# Patient Record
Sex: MALE | Race: WHITE | NOT HISPANIC OR LATINO | Employment: OTHER | URBAN - NONMETROPOLITAN AREA
[De-identification: names, ages, dates, MRNs, and addresses within clinical notes are randomized per-mention and may not be internally consistent; named-entity substitution may affect disease eponyms.]

---

## 2018-05-21 ENCOUNTER — OFFICE VISIT CONVERTED (OUTPATIENT)
Dept: FAMILY MEDICINE CLINIC | Age: 68
End: 2018-05-21
Attending: NURSE PRACTITIONER

## 2018-05-31 ENCOUNTER — OFFICE (OUTPATIENT)
Dept: URBAN - METROPOLITAN AREA CLINIC 5 | Facility: CLINIC | Age: 68
End: 2018-05-31

## 2018-05-31 VITALS — WEIGHT: 232 LBS | DIASTOLIC BLOOD PRESSURE: 76 MMHG | SYSTOLIC BLOOD PRESSURE: 106 MMHG | HEART RATE: 80 BPM

## 2018-05-31 DIAGNOSIS — R10.31 RIGHT LOWER QUADRANT PAIN: ICD-10-CM

## 2018-05-31 DIAGNOSIS — K21.9 GASTRO-ESOPHAGEAL REFLUX DISEASE WITHOUT ESOPHAGITIS: ICD-10-CM

## 2018-05-31 PROCEDURE — 99204 OFFICE O/P NEW MOD 45 MIN: CPT | Performed by: INTERNAL MEDICINE

## 2019-01-26 ENCOUNTER — HOSPITAL ENCOUNTER (OUTPATIENT)
Dept: OTHER | Facility: HOSPITAL | Age: 69
Discharge: HOME | End: 2019-01-26
Attending: NURSE PRACTITIONER

## 2019-01-26 ENCOUNTER — OFFICE VISIT CONVERTED (OUTPATIENT)
Dept: FAMILY MEDICINE CLINIC | Age: 69
End: 2019-01-26
Attending: NURSE PRACTITIONER

## 2019-01-26 LAB
ALBUMIN SERPL-MCNC: 3.9 G/DL (ref 3.5–5)
ALBUMIN/GLOB SERPL: 1.3 {RATIO} (ref 1.4–2.6)
ALP SERPL-CCNC: 71 U/L (ref 56–155)
ALT SERPL-CCNC: 81 U/L (ref 10–40)
ANION GAP SERPL CALC-SCNC: 16 MMOL/L (ref 8–19)
APPEARANCE UR: CLEAR
AST SERPL-CCNC: 83 U/L (ref 15–50)
BACTERIA UR QL AUTO: ABNORMAL
BILIRUB SERPL-MCNC: 0.95 MG/DL (ref 0.2–1.3)
BILIRUB UR QL: ABNORMAL
BUN SERPL-MCNC: 18 MG/DL (ref 5–25)
BUN/CREAT SERPL: 18 {RATIO} (ref 6–20)
CALCIUM SERPL-MCNC: 8.5 MG/DL (ref 8.7–10.4)
CASTS URNS QL MICRO: ABNORMAL /[LPF]
CHLORIDE SERPL-SCNC: 104 MMOL/L (ref 99–111)
COLOR UR: YELLOW
CONV CO2: 26 MMOL/L (ref 22–32)
CONV LEUKOCYTE ESTERASE: NEGATIVE
CONV TOTAL PROTEIN: 6.9 G/DL (ref 6.3–8.2)
CONV UROBILINOGEN IN URINE BY AUTOMATED TEST STRIP: 1 {EHRLICHU}/DL (ref 0.1–1)
CREAT UR-MCNC: 1.01 MG/DL (ref 0.7–1.2)
EPI CELLS #/AREA URNS HPF: ABNORMAL /[HPF]
ERYTHROCYTE [DISTWIDTH] IN BLOOD BY AUTOMATED COUNT: 12.9 % (ref 11.5–14.5)
GFR SERPLBLD BASED ON 1.73 SQ M-ARVRAT: >60 ML/MIN/{1.73_M2}
GLOBULIN UR ELPH-MCNC: 3 G/DL (ref 2–3.5)
GLUCOSE 24H UR-MCNC: NEGATIVE MG/DL
GLUCOSE SERPL-MCNC: 103 MG/DL (ref 70–99)
HBA1C MFR BLD: 13.8 G/DL (ref 14–18)
HCT VFR BLD AUTO: 41.9 % (ref 42–52)
HGB UR QL STRIP: ABNORMAL
KETONES UR QL STRIP: NEGATIVE MG/DL
MCH RBC QN AUTO: 28.7 PG (ref 27–31)
MCHC RBC AUTO-ENTMCNC: 32.9 G/DL (ref 33–37)
MCV RBC AUTO: 87.1 FL (ref 80–96)
MUCOUS THREADS URNS QL MICRO: ABNORMAL
NITRITE UR-MCNC: NEGATIVE MG/ML
OSMOLALITY SERPL CALC.SUM OF ELEC: 296 MOSM/KG (ref 273–304)
PH UR STRIP.AUTO: 5.5 [PH] (ref 5–8)
PLATELET # BLD AUTO: 262 10*3/UL (ref 130–400)
PMV BLD AUTO: 10.2 FL (ref 7.4–10.4)
POTASSIUM SERPL-SCNC: 3.6 MMOL/L (ref 3.5–5.3)
PROT UR-MCNC: NEGATIVE MG/DL
RBC # BLD AUTO: 4.81 10*6/UL (ref 4.7–6.1)
RBC # BLD AUTO: ABNORMAL /[HPF]
SODIUM SERPL-SCNC: 142 MMOL/L (ref 135–147)
SP GR UR STRIP: 1.02 (ref 1–1.03)
SPECIMEN SOURCE: ABNORMAL
UNIDENT CRYS URNS QL MICRO: ABNORMAL /[HPF]
WBC # BLD AUTO: 6.35 10*3/UL (ref 4.8–10.8)
WBC #/AREA URNS HPF: ABNORMAL /[HPF]

## 2019-01-28 LAB
C DIFF TOX B STL QL CT TISS CULT: NEGATIVE
CONV 027 TOXIN: NEGATIVE

## 2019-01-30 LAB
BACTERIA SPEC AEROBE CULT: NORMAL
O+P SPEC MICRO: NORMAL
O+P SPEC MICRO: NORMAL

## 2019-02-09 ENCOUNTER — HOSPITAL ENCOUNTER (OUTPATIENT)
Dept: OTHER | Facility: HOSPITAL | Age: 69
Discharge: HOME | End: 2019-02-09
Attending: NURSE PRACTITIONER

## 2019-02-09 LAB — PSA SERPL-MCNC: 0.6 NG/ML (ref 0–4)

## 2021-02-02 ENCOUNTER — HOSPITAL ENCOUNTER (OUTPATIENT)
Dept: VACCINE CLINIC | Facility: HOSPITAL | Age: 71
Discharge: HOME | End: 2021-02-02
Attending: INTERNAL MEDICINE

## 2021-02-05 ENCOUNTER — OFFICE VISIT CONVERTED (OUTPATIENT)
Dept: FAMILY MEDICINE CLINIC | Age: 71
End: 2021-02-05
Attending: NURSE PRACTITIONER

## 2021-02-08 ENCOUNTER — APPOINTMENT (OUTPATIENT)
Dept: CARDIOLOGY | Facility: HOSPITAL | Age: 71
End: 2021-02-08

## 2021-02-08 ENCOUNTER — APPOINTMENT (OUTPATIENT)
Dept: GENERAL RADIOLOGY | Facility: HOSPITAL | Age: 71
End: 2021-02-08

## 2021-02-08 ENCOUNTER — HOSPITAL ENCOUNTER (INPATIENT)
Facility: HOSPITAL | Age: 71
LOS: 2 days | Discharge: HOME | End: 2021-02-10
Attending: EMERGENCY MEDICINE | Admitting: INTERNAL MEDICINE

## 2021-02-08 ENCOUNTER — APPOINTMENT (OUTPATIENT)
Dept: CT IMAGING | Facility: HOSPITAL | Age: 71
End: 2021-02-08

## 2021-02-08 DIAGNOSIS — J12.82 PNEUMONIA DUE TO COVID-19 VIRUS: Primary | ICD-10-CM

## 2021-02-08 DIAGNOSIS — U07.1 PNEUMONIA DUE TO COVID-19 VIRUS: Primary | ICD-10-CM

## 2021-02-08 PROBLEM — N40.0 BPH WITHOUT OBSTRUCTION/LOWER URINARY TRACT SYMPTOMS: Status: ACTIVE | Noted: 2021-02-08

## 2021-02-08 PROBLEM — I10 HTN (HYPERTENSION): Status: ACTIVE | Noted: 2021-02-08

## 2021-02-08 PROBLEM — K21.9 GERD WITHOUT ESOPHAGITIS: Status: ACTIVE | Noted: 2021-02-08

## 2021-02-08 LAB
ALBUMIN SERPL-MCNC: 4 G/DL (ref 3.5–5.2)
ALBUMIN/GLOB SERPL: 1.3 G/DL
ALP SERPL-CCNC: 70 U/L (ref 39–117)
ALT SERPL W P-5'-P-CCNC: 27 U/L (ref 1–41)
ANION GAP SERPL CALCULATED.3IONS-SCNC: 15.2 MMOL/L (ref 5–15)
AST SERPL-CCNC: 23 U/L (ref 1–40)
B PARAPERT DNA SPEC QL NAA+PROBE: NOT DETECTED
B PERT DNA SPEC QL NAA+PROBE: NOT DETECTED
BASOPHILS # BLD AUTO: 0.01 10*3/MM3 (ref 0–0.2)
BASOPHILS NFR BLD AUTO: 0.2 % (ref 0–1.5)
BH CV LOWER VASCULAR LEFT COMMON FEMORAL AUGMENT: NORMAL
BH CV LOWER VASCULAR LEFT COMMON FEMORAL COMPETENT: NORMAL
BH CV LOWER VASCULAR LEFT COMMON FEMORAL COMPRESS: NORMAL
BH CV LOWER VASCULAR LEFT COMMON FEMORAL PHASIC: NORMAL
BH CV LOWER VASCULAR LEFT COMMON FEMORAL SPONT: NORMAL
BH CV LOWER VASCULAR LEFT DISTAL FEMORAL COMPRESS: NORMAL
BH CV LOWER VASCULAR LEFT GASTRONEMIUS COMPRESS: NORMAL
BH CV LOWER VASCULAR LEFT GREATER SAPH AK COMPRESS: NORMAL
BH CV LOWER VASCULAR LEFT GREATER SAPH BK COMPRESS: NORMAL
BH CV LOWER VASCULAR LEFT LESSER SAPH COMPRESS: NORMAL
BH CV LOWER VASCULAR LEFT MID FEMORAL AUGMENT: NORMAL
BH CV LOWER VASCULAR LEFT MID FEMORAL COMPETENT: NORMAL
BH CV LOWER VASCULAR LEFT MID FEMORAL COMPRESS: NORMAL
BH CV LOWER VASCULAR LEFT MID FEMORAL PHASIC: NORMAL
BH CV LOWER VASCULAR LEFT MID FEMORAL SPONT: NORMAL
BH CV LOWER VASCULAR LEFT PERONEAL COMPRESS: NORMAL
BH CV LOWER VASCULAR LEFT POPLITEAL AUGMENT: NORMAL
BH CV LOWER VASCULAR LEFT POPLITEAL COMPETENT: NORMAL
BH CV LOWER VASCULAR LEFT POPLITEAL COMPRESS: NORMAL
BH CV LOWER VASCULAR LEFT POPLITEAL PHASIC: NORMAL
BH CV LOWER VASCULAR LEFT POPLITEAL SPONT: NORMAL
BH CV LOWER VASCULAR LEFT POSTERIOR TIBIAL COMPRESS: NORMAL
BH CV LOWER VASCULAR LEFT PROFUNDA FEMORAL COMPRESS: NORMAL
BH CV LOWER VASCULAR LEFT PROXIMAL FEMORAL COMPRESS: NORMAL
BH CV LOWER VASCULAR LEFT SAPHENOFEMORAL JUNCTION COMPRESS: NORMAL
BH CV LOWER VASCULAR RIGHT COMMON FEMORAL AUGMENT: NORMAL
BH CV LOWER VASCULAR RIGHT COMMON FEMORAL COMPETENT: NORMAL
BH CV LOWER VASCULAR RIGHT COMMON FEMORAL COMPRESS: NORMAL
BH CV LOWER VASCULAR RIGHT COMMON FEMORAL PHASIC: NORMAL
BH CV LOWER VASCULAR RIGHT COMMON FEMORAL SPONT: NORMAL
BH CV LOWER VASCULAR RIGHT DISTAL FEMORAL COMPRESS: NORMAL
BH CV LOWER VASCULAR RIGHT GASTRONEMIUS COMPRESS: NORMAL
BH CV LOWER VASCULAR RIGHT GREATER SAPH AK COMPRESS: NORMAL
BH CV LOWER VASCULAR RIGHT GREATER SAPH BK COMPRESS: NORMAL
BH CV LOWER VASCULAR RIGHT LESSER SAPH COMPRESS: NORMAL
BH CV LOWER VASCULAR RIGHT MID FEMORAL AUGMENT: NORMAL
BH CV LOWER VASCULAR RIGHT MID FEMORAL COMPETENT: NORMAL
BH CV LOWER VASCULAR RIGHT MID FEMORAL COMPRESS: NORMAL
BH CV LOWER VASCULAR RIGHT MID FEMORAL PHASIC: NORMAL
BH CV LOWER VASCULAR RIGHT MID FEMORAL SPONT: NORMAL
BH CV LOWER VASCULAR RIGHT PERONEAL COMPRESS: NORMAL
BH CV LOWER VASCULAR RIGHT POPLITEAL AUGMENT: NORMAL
BH CV LOWER VASCULAR RIGHT POPLITEAL COMPETENT: NORMAL
BH CV LOWER VASCULAR RIGHT POPLITEAL COMPRESS: NORMAL
BH CV LOWER VASCULAR RIGHT POPLITEAL PHASIC: NORMAL
BH CV LOWER VASCULAR RIGHT POPLITEAL SPONT: NORMAL
BH CV LOWER VASCULAR RIGHT POSTERIOR TIBIAL COMPRESS: NORMAL
BH CV LOWER VASCULAR RIGHT PROFUNDA FEMORAL COMPRESS: NORMAL
BH CV LOWER VASCULAR RIGHT PROXIMAL FEMORAL COMPRESS: NORMAL
BH CV LOWER VASCULAR RIGHT SAPHENOFEMORAL JUNCTION COMPRESS: NORMAL
BILIRUB SERPL-MCNC: 0.5 MG/DL (ref 0–1.2)
BUN SERPL-MCNC: 21 MG/DL (ref 8–23)
BUN/CREAT SERPL: 15 (ref 7–25)
C PNEUM DNA NPH QL NAA+NON-PROBE: NOT DETECTED
CALCIUM SPEC-SCNC: 8.9 MG/DL (ref 8.6–10.5)
CHLORIDE SERPL-SCNC: 99 MMOL/L (ref 98–107)
CO2 SERPL-SCNC: 21.8 MMOL/L (ref 22–29)
CREAT SERPL-MCNC: 1.4 MG/DL (ref 0.76–1.27)
D DIMER PPP FEU-MCNC: 0.57 MCGFEU/ML (ref 0–0.49)
D-LACTATE SERPL-SCNC: 2.2 MMOL/L (ref 0.5–2)
D-LACTATE SERPL-SCNC: 4.4 MMOL/L (ref 0.5–2)
DEPRECATED RDW RBC AUTO: 39 FL (ref 37–54)
EOSINOPHIL # BLD AUTO: 0 10*3/MM3 (ref 0–0.4)
EOSINOPHIL NFR BLD AUTO: 0 % (ref 0.3–6.2)
ERYTHROCYTE [DISTWIDTH] IN BLOOD BY AUTOMATED COUNT: 12.8 % (ref 12.3–15.4)
FLUAV SUBTYP SPEC NAA+PROBE: NOT DETECTED
FLUBV RNA ISLT QL NAA+PROBE: NOT DETECTED
GFR SERPL CREATININE-BSD FRML MDRD: 50 ML/MIN/1.73
GLOBULIN UR ELPH-MCNC: 3 GM/DL
GLUCOSE SERPL-MCNC: 195 MG/DL (ref 65–99)
HADV DNA SPEC NAA+PROBE: NOT DETECTED
HCOV 229E RNA SPEC QL NAA+PROBE: NOT DETECTED
HCOV HKU1 RNA SPEC QL NAA+PROBE: NOT DETECTED
HCOV NL63 RNA SPEC QL NAA+PROBE: NOT DETECTED
HCOV OC43 RNA SPEC QL NAA+PROBE: NOT DETECTED
HCT VFR BLD AUTO: 39.5 % (ref 37.5–51)
HGB BLD-MCNC: 13.4 G/DL (ref 13–17.7)
HMPV RNA NPH QL NAA+NON-PROBE: NOT DETECTED
HPIV1 RNA SPEC QL NAA+PROBE: NOT DETECTED
HPIV2 RNA SPEC QL NAA+PROBE: NOT DETECTED
HPIV3 RNA NPH QL NAA+PROBE: NOT DETECTED
HPIV4 P GENE NPH QL NAA+PROBE: NOT DETECTED
IMM GRANULOCYTES # BLD AUTO: 0.02 10*3/MM3 (ref 0–0.05)
IMM GRANULOCYTES NFR BLD AUTO: 0.5 % (ref 0–0.5)
LACTATE HOLD SPECIMEN: NORMAL
LYMPHOCYTES # BLD AUTO: 0.33 10*3/MM3 (ref 0.7–3.1)
LYMPHOCYTES NFR BLD AUTO: 7.7 % (ref 19.6–45.3)
M PNEUMO IGG SER IA-ACNC: NOT DETECTED
MCH RBC QN AUTO: 28.8 PG (ref 26.6–33)
MCHC RBC AUTO-ENTMCNC: 33.9 G/DL (ref 31.5–35.7)
MCV RBC AUTO: 84.8 FL (ref 79–97)
MONOCYTES # BLD AUTO: 0.11 10*3/MM3 (ref 0.1–0.9)
MONOCYTES NFR BLD AUTO: 2.6 % (ref 5–12)
NEUTROPHILS NFR BLD AUTO: 3.79 10*3/MM3 (ref 1.7–7)
NEUTROPHILS NFR BLD AUTO: 89 % (ref 42.7–76)
NRBC BLD AUTO-RTO: 0 /100 WBC (ref 0–0.2)
PLATELET # BLD AUTO: 195 10*3/MM3 (ref 140–450)
PMV BLD AUTO: 10.5 FL (ref 6–12)
POTASSIUM SERPL-SCNC: 4.3 MMOL/L (ref 3.5–5.2)
PROCALCITONIN SERPL-MCNC: 0.14 NG/ML (ref 0–0.25)
PROT SERPL-MCNC: 7 G/DL (ref 6–8.5)
QT INTERVAL: 340 MS
RBC # BLD AUTO: 4.66 10*6/MM3 (ref 4.14–5.8)
RHINOVIRUS RNA SPEC NAA+PROBE: NOT DETECTED
RSV RNA NPH QL NAA+NON-PROBE: NOT DETECTED
SARS-COV-2 RNA NPH QL NAA+NON-PROBE: DETECTED
SODIUM SERPL-SCNC: 136 MMOL/L (ref 136–145)
TROPONIN T SERPL-MCNC: <0.01 NG/ML (ref 0–0.03)
TROPONIN T SERPL-MCNC: <0.01 NG/ML (ref 0–0.03)
WBC # BLD AUTO: 4.26 10*3/MM3 (ref 3.4–10.8)

## 2021-02-08 PROCEDURE — 80053 COMPREHEN METABOLIC PANEL: CPT | Performed by: EMERGENCY MEDICINE

## 2021-02-08 PROCEDURE — 83605 ASSAY OF LACTIC ACID: CPT | Performed by: EMERGENCY MEDICINE

## 2021-02-08 PROCEDURE — 0 IOPAMIDOL PER 1 ML: Performed by: EMERGENCY MEDICINE

## 2021-02-08 PROCEDURE — 85379 FIBRIN DEGRADATION QUANT: CPT | Performed by: EMERGENCY MEDICINE

## 2021-02-08 PROCEDURE — 93970 EXTREMITY STUDY: CPT

## 2021-02-08 PROCEDURE — 84145 PROCALCITONIN (PCT): CPT | Performed by: EMERGENCY MEDICINE

## 2021-02-08 PROCEDURE — 71045 X-RAY EXAM CHEST 1 VIEW: CPT

## 2021-02-08 PROCEDURE — 25010000002 CEFEPIME PER 500 MG: Performed by: EMERGENCY MEDICINE

## 2021-02-08 PROCEDURE — 25010000002 VANCOMYCIN 10 G RECONSTITUTED SOLUTION: Performed by: EMERGENCY MEDICINE

## 2021-02-08 PROCEDURE — 93005 ELECTROCARDIOGRAM TRACING: CPT | Performed by: EMERGENCY MEDICINE

## 2021-02-08 PROCEDURE — 93010 ELECTROCARDIOGRAM REPORT: CPT | Performed by: INTERNAL MEDICINE

## 2021-02-08 PROCEDURE — 0202U NFCT DS 22 TRGT SARS-COV-2: CPT | Performed by: EMERGENCY MEDICINE

## 2021-02-08 PROCEDURE — 63710000001 DEXAMETHASONE PER 0.25 MG: Performed by: NURSE PRACTITIONER

## 2021-02-08 PROCEDURE — 99285 EMERGENCY DEPT VISIT HI MDM: CPT

## 2021-02-08 PROCEDURE — 71275 CT ANGIOGRAPHY CHEST: CPT

## 2021-02-08 PROCEDURE — 87040 BLOOD CULTURE FOR BACTERIA: CPT | Performed by: EMERGENCY MEDICINE

## 2021-02-08 PROCEDURE — 85025 COMPLETE CBC W/AUTO DIFF WBC: CPT | Performed by: EMERGENCY MEDICINE

## 2021-02-08 PROCEDURE — 84484 ASSAY OF TROPONIN QUANT: CPT | Performed by: EMERGENCY MEDICINE

## 2021-02-08 PROCEDURE — 36415 COLL VENOUS BLD VENIPUNCTURE: CPT

## 2021-02-08 PROCEDURE — 25010000002 ENOXAPARIN PER 10 MG: Performed by: NURSE PRACTITIONER

## 2021-02-08 RX ORDER — ACETAMINOPHEN 325 MG/1
650 TABLET ORAL EVERY 4 HOURS PRN
Status: DISCONTINUED | OUTPATIENT
Start: 2021-02-08 | End: 2021-02-10 | Stop reason: HOSPADM

## 2021-02-08 RX ORDER — SODIUM CHLORIDE 9 MG/ML
75 INJECTION, SOLUTION INTRAVENOUS CONTINUOUS
Status: DISCONTINUED | OUTPATIENT
Start: 2021-02-08 | End: 2021-02-09

## 2021-02-08 RX ORDER — LEVOFLOXACIN 500 MG/1
TABLET, FILM COATED ORAL
COMMUNITY
End: 2021-02-10 | Stop reason: HOSPADM

## 2021-02-08 RX ORDER — ACETAMINOPHEN 650 MG/1
650 SUPPOSITORY RECTAL EVERY 4 HOURS PRN
Status: DISCONTINUED | OUTPATIENT
Start: 2021-02-08 | End: 2021-02-10 | Stop reason: HOSPADM

## 2021-02-08 RX ORDER — AZITHROMYCIN 250 MG/1
TABLET, FILM COATED ORAL
Status: ON HOLD | COMMUNITY
End: 2021-02-08

## 2021-02-08 RX ORDER — AMLODIPINE BESYLATE 5 MG/1
5 TABLET ORAL DAILY
Status: DISCONTINUED | OUTPATIENT
Start: 2021-02-08 | End: 2021-02-10 | Stop reason: HOSPADM

## 2021-02-08 RX ORDER — ONDANSETRON 4 MG/1
4 TABLET, FILM COATED ORAL EVERY 6 HOURS PRN
Status: DISCONTINUED | OUTPATIENT
Start: 2021-02-08 | End: 2021-02-10 | Stop reason: HOSPADM

## 2021-02-08 RX ORDER — TRAMADOL HYDROCHLORIDE 50 MG/1
100 TABLET ORAL EVERY MORNING
Status: DISCONTINUED | OUTPATIENT
Start: 2021-02-08 | End: 2021-02-10 | Stop reason: HOSPADM

## 2021-02-08 RX ORDER — AMOXICILLIN 875 MG/1
TABLET, COATED ORAL
Status: ON HOLD | COMMUNITY
End: 2021-02-08

## 2021-02-08 RX ORDER — SODIUM CHLORIDE 0.9 % (FLUSH) 0.9 %
10 SYRINGE (ML) INJECTION AS NEEDED
Status: DISCONTINUED | OUTPATIENT
Start: 2021-02-08 | End: 2021-02-10 | Stop reason: HOSPADM

## 2021-02-08 RX ORDER — CALCIUM CARBONATE 200(500)MG
2 TABLET,CHEWABLE ORAL 2 TIMES DAILY PRN
Status: DISCONTINUED | OUTPATIENT
Start: 2021-02-08 | End: 2021-02-10 | Stop reason: HOSPADM

## 2021-02-08 RX ORDER — ATENOLOL 50 MG/1
50 TABLET ORAL
Status: DISCONTINUED | OUTPATIENT
Start: 2021-02-08 | End: 2021-02-10 | Stop reason: HOSPADM

## 2021-02-08 RX ORDER — ONDANSETRON 2 MG/ML
4 INJECTION INTRAMUSCULAR; INTRAVENOUS EVERY 6 HOURS PRN
Status: DISCONTINUED | OUTPATIENT
Start: 2021-02-08 | End: 2021-02-10 | Stop reason: HOSPADM

## 2021-02-08 RX ORDER — PANTOPRAZOLE SODIUM 40 MG/1
40 TABLET, DELAYED RELEASE ORAL EVERY MORNING
Status: DISCONTINUED | OUTPATIENT
Start: 2021-02-08 | End: 2021-02-10 | Stop reason: HOSPADM

## 2021-02-08 RX ORDER — OMEPRAZOLE 40 MG/1
CAPSULE, DELAYED RELEASE ORAL
COMMUNITY
End: 2022-03-25

## 2021-02-08 RX ORDER — LOSARTAN POTASSIUM 50 MG/1
50 TABLET ORAL DAILY
COMMUNITY
End: 2023-04-04 | Stop reason: SDUPTHER

## 2021-02-08 RX ORDER — TRAMADOL HYDROCHLORIDE 100 MG/1
50 TABLET, COATED ORAL DAILY
COMMUNITY

## 2021-02-08 RX ORDER — BISACODYL 5 MG/1
5 TABLET, DELAYED RELEASE ORAL DAILY PRN
Status: DISCONTINUED | OUTPATIENT
Start: 2021-02-08 | End: 2021-02-10 | Stop reason: HOSPADM

## 2021-02-08 RX ORDER — SODIUM CHLORIDE 0.9 % (FLUSH) 0.9 %
10 SYRINGE (ML) INJECTION EVERY 12 HOURS SCHEDULED
Status: DISCONTINUED | OUTPATIENT
Start: 2021-02-08 | End: 2021-02-10 | Stop reason: HOSPADM

## 2021-02-08 RX ORDER — ATENOLOL 50 MG/1
50 TABLET ORAL DAILY
COMMUNITY

## 2021-02-08 RX ORDER — HYDROCODONE BITARTRATE AND ACETAMINOPHEN 5; 325 MG/1; MG/1
1 TABLET ORAL EVERY 6 HOURS PRN
Status: DISCONTINUED | OUTPATIENT
Start: 2021-02-08 | End: 2021-02-10 | Stop reason: HOSPADM

## 2021-02-08 RX ORDER — AMLODIPINE BESYLATE 5 MG/1
5 TABLET ORAL DAILY
COMMUNITY

## 2021-02-08 RX ORDER — TAMSULOSIN HYDROCHLORIDE 0.4 MG/1
CAPSULE ORAL
COMMUNITY
End: 2021-02-08

## 2021-02-08 RX ORDER — ACETAMINOPHEN 160 MG/5ML
650 SOLUTION ORAL EVERY 4 HOURS PRN
Status: DISCONTINUED | OUTPATIENT
Start: 2021-02-08 | End: 2021-02-10 | Stop reason: HOSPADM

## 2021-02-08 RX ORDER — HYDROCODONE BITARTRATE AND ACETAMINOPHEN 5; 325 MG/1; MG/1
TABLET ORAL
COMMUNITY
End: 2022-03-25

## 2021-02-08 RX ADMIN — SODIUM CHLORIDE 3000 ML: 9 INJECTION, SOLUTION INTRAVENOUS at 01:36

## 2021-02-08 RX ADMIN — VANCOMYCIN HYDROCHLORIDE 2250 MG: 10 INJECTION, POWDER, LYOPHILIZED, FOR SOLUTION INTRAVENOUS at 02:22

## 2021-02-08 RX ADMIN — SODIUM CHLORIDE 100 ML/HR: 9 INJECTION, SOLUTION INTRAVENOUS at 16:35

## 2021-02-08 RX ADMIN — IOPAMIDOL 100 ML: 755 INJECTION, SOLUTION INTRAVENOUS at 05:06

## 2021-02-08 RX ADMIN — ENOXAPARIN SODIUM 40 MG: 40 INJECTION SUBCUTANEOUS at 14:57

## 2021-02-08 RX ADMIN — ACETAMINOPHEN 650 MG: 325 TABLET, FILM COATED ORAL at 21:01

## 2021-02-08 RX ADMIN — PANTOPRAZOLE SODIUM 40 MG: 40 TABLET, DELAYED RELEASE ORAL at 14:57

## 2021-02-08 RX ADMIN — SODIUM CHLORIDE 100 ML/HR: 9 INJECTION, SOLUTION INTRAVENOUS at 07:54

## 2021-02-08 RX ADMIN — TRAMADOL HYDROCHLORIDE 100 MG: 50 TABLET, FILM COATED ORAL at 09:46

## 2021-02-08 RX ADMIN — ATENOLOL 50 MG: 50 TABLET ORAL at 14:57

## 2021-02-08 RX ADMIN — AMLODIPINE BESYLATE 5 MG: 5 TABLET ORAL at 16:35

## 2021-02-08 RX ADMIN — CEFEPIME HYDROCHLORIDE 2 G: 2 INJECTION, POWDER, FOR SOLUTION INTRAVENOUS at 01:40

## 2021-02-08 RX ADMIN — DEXAMETHASONE 6 MG: 4 TABLET ORAL at 08:07

## 2021-02-08 NOTE — ED NOTES
Patient was placed in face mask in first look. Patient was wearing facemask when I entered the room and throughout our encounter. I wore full protective equipment throughout this patient encounter including a  (N95)face mask, eye shield, gown, and gloves. Hand hygiene was performed before donning protective equipment and after removal when leaving the room .     Chanel Nunez RN  02/08/21 0145

## 2021-02-08 NOTE — ED NOTES
Pt ambulatory to triage from home with c/o pneumonia and dehydration.  States spoke with his doctor before he came here, and was told to come right in because he has pneumonia (had chest xray at 1800), and is dehydrated. Pt states fever tmax 102 at home - given aspirin 1930.  Pt denies soa, states has generalized body aches. Pt came in with oxygen at 2lpm nc (doesn't normally wear oxygen, borrowed it from a friend).  Pt provided with mask in triage.  Triage personnel wore appropriate PPE, mask, gloves and goggles         Rosalba Washburn, RN  02/08/21 0021

## 2021-02-08 NOTE — ED NOTES
Pt was in mask throughout encounter. I wore PPE while in room including gloves, N95 mask, gown, hair cover and goggles. Hand hygiene was performed before and after donning and doffing PPE.       Terry Roque  02/08/21 [id number]

## 2021-02-08 NOTE — ED PROVIDER NOTES
EMERGENCY DEPARTMENT ENCOUNTER    Room Number:  15/15  Date of encounter:  2/8/2021  PCP: Sarwat Iniguez MD  Historian: Patient      HPI:  Chief Complaint: Dyspnea      Context: Martir Porter is a 70 y.o. male who presents to the ED c/o 2 days of worsening cough and shortness of breath.  Per note from his daughter who is a nurse practitioner patient has been satting in the 80s at home.  They borrowed an oxygen concentrator and he has been on 2 L.  Was seen in the urgent care today and told he had pneumonia.  Continue to get short of breath and spoke with his doctor who advised him to come to the ED.      PAST MEDICAL HISTORY  Active Ambulatory Problems     Diagnosis Date Noted   • No Active Ambulatory Problems     Resolved Ambulatory Problems     Diagnosis Date Noted   • No Resolved Ambulatory Problems     Past Medical History:   Diagnosis Date   • Hypertension    • Pneumonia          PAST SURGICAL HISTORY  History reviewed. No pertinent surgical history.      FAMILY HISTORY  History reviewed. No pertinent family history.      SOCIAL HISTORY  Social History     Socioeconomic History   • Marital status:      Spouse name: Not on file   • Number of children: Not on file   • Years of education: Not on file   • Highest education level: Not on file   Tobacco Use   • Smoking status: Unknown If Ever Smoked   Substance and Sexual Activity   • Alcohol use: Never     Frequency: Never   • Drug use: Never   • Sexual activity: Defer         ALLERGIES  Patient has no known allergies.        REVIEW OF SYSTEMS  Review of Systems     All systems reviewed and negative except for those discussed in HPI.       PHYSICAL EXAM    I have reviewed the triage vital signs and nursing notes.    ED Triage Vitals   Temp Heart Rate Resp BP SpO2   02/08/21 0017 02/08/21 0017 02/08/21 0017 02/08/21 0021 02/08/21 0017   97.9 °F (36.6 °C) 112 16 147/83 98 %      Temp src Heart Rate Source Patient Position BP Location FiO2 (%)    02/08/21 0017 02/08/21 0017 02/08/21 0021 02/08/21 0021 --   Tympanic Monitor Sitting Right arm        Physical Exam  GENERAL: not distressed  HENT: nares patent  EYES: no scleral icterus  CV: regular rhythm, regular rate  RESPIRATORY: normal effort, coarse breath sounds to left side  ABDOMEN: soft  MUSCULOSKELETAL: no deformity  NEURO: alert, moves all extremities, follows commands  SKIN: warm, dry        LAB RESULTS  Recent Results (from the past 24 hour(s))   ECG 12 Lead    Collection Time: 02/08/21 12:57 AM   Result Value Ref Range    QT Interval 340 ms   Comprehensive Metabolic Panel    Collection Time: 02/08/21  1:00 AM    Specimen: Blood   Result Value Ref Range    Glucose 195 (H) 65 - 99 mg/dL    BUN 21 8 - 23 mg/dL    Creatinine 1.40 (H) 0.76 - 1.27 mg/dL    Sodium 136 136 - 145 mmol/L    Potassium 4.3 3.5 - 5.2 mmol/L    Chloride 99 98 - 107 mmol/L    CO2 21.8 (L) 22.0 - 29.0 mmol/L    Calcium 8.9 8.6 - 10.5 mg/dL    Total Protein 7.0 6.0 - 8.5 g/dL    Albumin 4.00 3.50 - 5.20 g/dL    ALT (SGPT) 27 1 - 41 U/L    AST (SGOT) 23 1 - 40 U/L    Alkaline Phosphatase 70 39 - 117 U/L    Total Bilirubin 0.5 0.0 - 1.2 mg/dL    eGFR Non African Amer 50 (L) >60 mL/min/1.73    Globulin 3.0 gm/dL    A/G Ratio 1.3 g/dL    BUN/Creatinine Ratio 15.0 7.0 - 25.0    Anion Gap 15.2 (H) 5.0 - 15.0 mmol/L   Procalcitonin    Collection Time: 02/08/21  1:00 AM    Specimen: Blood   Result Value Ref Range    Procalcitonin 0.14 0.00 - 0.25 ng/mL   Lactic Acid, Plasma    Collection Time: 02/08/21  1:00 AM    Specimen: Blood   Result Value Ref Range    Lactate 4.4 (C) 0.5 - 2.0 mmol/L   Troponin    Collection Time: 02/08/21  1:00 AM    Specimen: Blood   Result Value Ref Range    Troponin T <0.010 0.000 - 0.030 ng/mL   CBC Auto Differential    Collection Time: 02/08/21  1:00 AM    Specimen: Blood   Result Value Ref Range    WBC 4.26 3.40 - 10.80 10*3/mm3    RBC 4.66 4.14 - 5.80 10*6/mm3    Hemoglobin 13.4 13.0 - 17.7 g/dL     Hematocrit 39.5 37.5 - 51.0 %    MCV 84.8 79.0 - 97.0 fL    MCH 28.8 26.6 - 33.0 pg    MCHC 33.9 31.5 - 35.7 g/dL    RDW 12.8 12.3 - 15.4 %    RDW-SD 39.0 37.0 - 54.0 fl    MPV 10.5 6.0 - 12.0 fL    Platelets 195 140 - 450 10*3/mm3    Neutrophil % 89.0 (H) 42.7 - 76.0 %    Lymphocyte % 7.7 (L) 19.6 - 45.3 %    Monocyte % 2.6 (L) 5.0 - 12.0 %    Eosinophil % 0.0 (L) 0.3 - 6.2 %    Basophil % 0.2 0.0 - 1.5 %    Immature Grans % 0.5 0.0 - 0.5 %    Neutrophils, Absolute 3.79 1.70 - 7.00 10*3/mm3    Lymphocytes, Absolute 0.33 (L) 0.70 - 3.10 10*3/mm3    Monocytes, Absolute 0.11 0.10 - 0.90 10*3/mm3    Eosinophils, Absolute 0.00 0.00 - 0.40 10*3/mm3    Basophils, Absolute 0.01 0.00 - 0.20 10*3/mm3    Immature Grans, Absolute 0.02 0.00 - 0.05 10*3/mm3    nRBC 0.0 0.0 - 0.2 /100 WBC   Lactic Acid, Reflex Timer (This will reflex a repeat order 3-3:15 hours after ordered.)    Collection Time: 02/08/21  1:00 AM    Specimen: Blood   Result Value Ref Range    Hold Tube Hold for add-ons.    Respiratory Panel PCR w/COVID-19(SARS-CoV-2) [facility] In-House, NP Swab in UTM/VTM, 3-4 HR TAT - Swab, Nasopharynx    Collection Time: 02/08/21  1:01 AM    Specimen: Nasopharynx; Swab   Result Value Ref Range    ADENOVIRUS, PCR Not Detected Not Detected    Coronavirus 229E Not Detected Not Detected    Coronavirus HKU1 Not Detected Not Detected    Coronavirus NL63 Not Detected Not Detected    Coronavirus OC43 Not Detected Not Detected    COVID19 Detected (C) Not Detected - Ref. Range    Human Metapneumovirus Not Detected Not Detected    Human Rhinovirus/Enterovirus Not Detected Not Detected    Influenza A PCR Not Detected Not Detected    Influenza B PCR Not Detected Not Detected    Parainfluenza Virus 1 Not Detected Not Detected    Parainfluenza Virus 2 Not Detected Not Detected    Parainfluenza Virus 3 Not Detected Not Detected    Parainfluenza Virus 4 Not Detected Not Detected    RSV, PCR Not Detected Not Detected     Bordetella pertussis pcr Not Detected Not Detected    Bordetella parapertussis PCR Not Detected Not Detected    Chlamydophila pneumoniae PCR Not Detected Not Detected    Mycoplasma pneumo by PCR Not Detected Not Detected   D-dimer, Quantitative    Collection Time: 02/08/21  1:33 AM    Specimen: Blood   Result Value Ref Range    D-Dimer, Quantitative 0.57 (H) 0.00 - 0.49 MCGFEU/mL   Troponin    Collection Time: 02/08/21  3:19 AM    Specimen: Arm, Left; Blood   Result Value Ref Range    Troponin T <0.010 0.000 - 0.030 ng/mL   Lactic Acid, Reflex    Collection Time: 02/08/21  4:42 AM    Specimen: Blood   Result Value Ref Range    Lactate 2.2 (C) 0.5 - 2.0 mmol/L       Ordered the above labs and independently reviewed the results.        RADIOLOGY  Ct Angiogram Chest    Result Date: 2/8/2021  CT ANGIOGRAM OF THE CHEST  HISTORY: Cough. Hypoxia. HISTORY of COVID.  COMPARISON: None available.  TECHNIQUE: Axial CT imaging was obtained through the thorax. IV contrast was administered. Three-D reformatted images were obtained.  FINDINGS: The exam is degraded by poor timing of the contrast bolus. No large central pulmonary thromboembolus is seen. Thoracic aorta is normal in caliber. There is no evidence of dissection. There are some coronary artery calcifications. The thyroid gland, trachea, and esophagus appear unremarkable. Mediastinal lymph nodes do not appear pathologically enlarged, although the patient does have some prominent hilar lymph nodes. For example, a right hilar node measures 2.3 x 1 0 cm. Multifocal pulmonary opacities are seen. Their morphology and distribution is keeping with history of COVID. The patient has pneumobilia. No acute abnormalities are seen within the upper abdomen. Gallbladder is surgically absent. 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 No acute osseous abnormalities are seen.       1. Examination is degraded by poor timing of the contrast bolus. No obvious pulmonary  thromboembolus is seen. 2. Bilateral pulmonary opacities. Their distribution and morphology is in keeping with COVID. 3. Prominent right hilar nodes. Short-term CT follow-up to document resolution is suggested.  Radiation dose reduction techniques were utilized, including automated exposure control and exposure modulation based on body size.  This report was finalized on 2/8/2021 5:26 AM by Dr. Aliya Magallon M.D.      Xr Chest Ap    Result Date: 2/8/2021  SINGLE VIEW OF THE CHEST  HISTORY: Cough and fever. Concern for COVID 19.  COMPARISON: None available.  FINDINGS: The patient has bilateral pulmonary opacities. There appearance is nonspecific, but certainly suggestive of COVID. Heart size is within normal limits. Lungs appear clear. There is some blunting of the left costophrenic angle. Trace effusion is not excluded.      Bilateral pulmonary opacities, in a peripheral and basilar predominant distribution. Appearance is suggestive of COVID. Correlation with testing is recommended.  This report was finalized on 2/8/2021 1:28 AM by Dr. Aliya Magallon M.D.        I ordered the above noted radiological studies. Reviewed by me and discussed with radiologist.  See dictation for official radiology interpretation.      PROCEDURES    Procedures      MEDICATIONS GIVEN IN ER    Medications   sodium chloride 0.9 % flush 10 mL (has no administration in time range)   sodium chloride 0.9 % bolus 500 mL (0 mL Intravenous Hold 2/8/21 0132)   sodium chloride 0.9 % flush 10 mL (has no administration in time range)   sodium chloride 0.9 % flush 10 mL (has no administration in time range)   Pharmacy to Dose enoxaparin (LOVENOX) (has no administration in time range)   acetaminophen (TYLENOL) tablet 650 mg (has no administration in time range)     Or   acetaminophen (TYLENOL) 160 MG/5ML solution 650 mg (has no administration in time range)     Or   acetaminophen (TYLENOL) suppository 650 mg (has no administration in time range)    bisacodyl (DULCOLAX) EC tablet 5 mg (has no administration in time range)   ondansetron (ZOFRAN) tablet 4 mg (has no administration in time range)     Or   ondansetron (ZOFRAN) injection 4 mg (has no administration in time range)   calcium carbonate (TUMS) chewable tablet 500 mg (200 mg elemental) (has no administration in time range)   sodium chloride 0.9 % infusion (has no administration in time range)   dexamethasone (DECADRON) tablet 6 mg (has no administration in time range)   sodium chloride 0.9 % bolus 3,240 mL (0 mL Intravenous Stopped 2/8/21 0425)   cefepime (MAXIPIME) 2 g/100 mL 0.9% NS (mbp) (0 g Intravenous Stopped 2/8/21 9814)   vancomycin 2250 mg/500 mL 0.9% NS IVPB ([facility]) (0 mg Intravenous Stopped 2/8/21 4060)   iopamidol (ISOVUE-370) 76 % injection 100 mL (100 mL Intravenous Given by Other 2/8/21 2117)         PROGRESS, DATA ANALYSIS, CONSULTS, AND MEDICAL DECISION MAKING    All labs have been independently reviewed by me.  All radiology studies have been reviewed by me and discussed with radiologist dictating the report.   EKG's independently viewed and interpreted by me.  Discussion below represents my analysis of pertinent findings related to patient's condition, differential diagnosis, treatment plan and final disposition.        ED Course as of Feb 08 0652   Mon Feb 08, 2021   0111 EKG          EKG time: 00 57  Rhythm/Rate: 107, sinus tach  P waves and CA: Within normal limits  QRS, axis: Giller narrow  ST and T waves: No ST changes    Interpreted Contemporaneously by me, independently viewed    [TJ]   0512 [unfilled]      [TJ]      ED Course User Index  [TJ] Guille Rod MD           PPE: Both the patient and I wore a surgical mask throughout the entire patient encounter. I wore protective goggles.     AS OF 06:52 EST VITALS:    BP - 132/72  HR - 102  TEMP - 97.4 °F (36.3 °C) (Tympanic)  O2 SATS - 96%        DIAGNOSIS  Final diagnoses:   Pneumonia due to  COVID-19 virus         DISPOSITION  admit           Guille Rod MD  02/08/21 0673

## 2021-02-08 NOTE — H&P
Patient Name:  Martir Porter  YOB: 1950  MRN:  [id number]  Admit Date:  2/8/2021  Patient Care Team:  Sarwat Iniguez MD as PCP - General (Internal Medicine)      Subjective   History Present Illness     Chief Complaint   Patient presents with   • Fever   • Cough     HPI  Mr. Porter is a 70 y.o.with a history of HTN, GERD and BPH that presents to [facility] complaining of fever, cough and shortness of breath. Patient reports symptoms began about 2 weeks ago and have progressively worsened. Per ED notes his o2 saturations were in the 80s at home therefore, his daughter borrowed an oxygen concentrator and placed him on 2L o2 with good response. Since being here he has been on room air with o2 saturations in the mid to high 90s. At present he denies shortness of breath but reports a non productive cough. Patient reports staying at home all last week as he felt too ill to work. His daughter took him to an urgent care center where he was diagnosed with COVID PNA. They prescribed azithromycin and augmentin. His symptoms worsened last night and his PCP recommend he come to the hospital for further evaluation and treatment.     Review of Systems   Constitutional: Positive for fever. Negative for chills.   HENT: Positive for congestion. Negative for sore throat.    Eyes: Negative for discharge and itching.   Respiratory: Positive for cough and shortness of breath (much improved and on RA). Negative for wheezing.    Cardiovascular: Negative for chest pain, palpitations and leg swelling.   Gastrointestinal: Negative for abdominal pain, diarrhea, nausea and vomiting.   Endocrine: Negative for cold intolerance and heat intolerance.   Genitourinary: Negative for difficulty urinating and dysuria.   Musculoskeletal: Positive for back pain. Negative for gait problem.   Skin: Negative for color change and wound.   Allergic/Immunologic: Negative for environmental allergies and food  allergies.   Neurological: Negative for dizziness.   Hematological: Negative for adenopathy. Does not bruise/bleed easily.   Psychiatric/Behavioral: Negative for agitation and confusion.        Personal History     Past Medical History:   Diagnosis Date   • Hypertension    • Pneumonia      History reviewed. No pertinent surgical history.  History reviewed. No pertinent family history.  Social History     Tobacco Use   • Smoking status: Unknown If Ever Smoked   Substance Use Topics   • Alcohol use: Never     Frequency: Never   • Drug use: Never     No current facility-administered medications on file prior to encounter.      Current Outpatient Medications on File Prior to Encounter   Medication Sig Dispense Refill   • amLODIPine (NORVASC) 5 MG tablet Take 5 mg by mouth Daily.     • losartan (COZAAR) 50 MG tablet Take 50 mg by mouth Daily.     • amoxicillin (AMOXIL) 875 MG tablet amoxicillin 875 mg tablet     • atenolol (TENORMIN) 50 MG tablet atenolol 50 mg tablet     • azithromycin (ZITHROMAX) 250 MG tablet azithromycin 250 mg tablet     • HYDROcodone-acetaminophen (NORCO) 5-325 MG per tablet hydrocodone 5 mg-acetaminophen 325 mg tablet     • levoFLOXacin (LEVAQUIN) 500 MG tablet levofloxacin 500 mg tablet     • omeprazole (priLOSEC) 40 MG capsule omeprazole 40 mg capsule,delayed release     • traMADol (ULTRAM) 50 MG tablet tramadol 50 mg tablet     • [DISCONTINUED] tamsulosin (FLOMAX) 0.4 MG capsule 24 hr capsule tamsulosin 0.4 mg capsule       No Known Allergies    Objective    Objective     Vital Signs  Temp:  [97.4 °F (36.3 °C)-97.9 °F (36.6 °C)] 97.4 °F (36.3 °C)  Heart Rate:  [] 102  Resp:  [16-18] 18  BP: (132-162)/(69-93) 162/76  SpO2:  [91 %-98 %] 96 %  on  Flow (L/min):  [2] 2;   Device (Oxygen Therapy): room air  Body mass index is 32.21 kg/m².    Physical Exam  Vitals signs and nursing note reviewed.   Constitutional:       Appearance: He is obese.      Comments: appears stated age, stable   HENT:       Head: Normocephalic and atraumatic.   Eyes:      Extraocular Movements: Extraocular movements intact.      Conjunctiva/sclera: Conjunctivae normal.   Neck:      Musculoskeletal: Normal range of motion and neck supple.   Cardiovascular:      Rate and Rhythm: Normal rate and regular rhythm.      Comments: tachy at times  Pulmonary:      Effort: Pulmonary effort is normal. No respiratory distress.      Breath sounds: Normal breath sounds.   Abdominal:      General: Bowel sounds are normal. There is no distension.      Palpations: Abdomen is soft.   Musculoskeletal: Normal range of motion.         General: No swelling.   Skin:     General: Skin is warm and dry.   Neurological:      Mental Status: He is alert and oriented to person, place, and time. Mental status is at baseline.   Psychiatric:         Mood and Affect: Mood normal.         Behavior: Behavior normal.         Results Review:  I reviewed the patient's new clinical results.  I reviewed the patient's new imaging results and agree with the interpretation.  I reviewed the patient's other test results and agree with the interpretation  I personally viewed and interpreted the patient's EKG/Telemetry data  Discussed with ED provider.    Lab Results (last 24 hours)     Procedure Component Value Units Date/Time    CBC & Differential [[id number]]  (Abnormal) Collected: 02/08/21 0100    Specimen: Blood Updated: 02/08/21 0123    Narrative:      The following orders were created for panel order CBC & Differential.  Procedure                               Abnormality         Status                     ---------                               -----------         ------                     CBC Auto Differential[[id number]]        Abnormal            Final result                 Please view results for these tests on the individual orders.    Comprehensive Metabolic Panel [[id number]]  (Abnormal) Collected: 02/08/21 0100    Specimen: Blood Updated: 02/08/21 0135     Glucose  "195 mg/dL      BUN 21 mg/dL      Creatinine 1.40 mg/dL      Sodium 136 mmol/L      Potassium 4.3 mmol/L      Chloride 99 mmol/L      CO2 21.8 mmol/L      Calcium 8.9 mg/dL      Total Protein 7.0 g/dL      Albumin 4.00 g/dL      ALT (SGPT) 27 U/L      AST (SGOT) 23 U/L      Alkaline Phosphatase 70 U/L      Total Bilirubin 0.5 mg/dL      eGFR Non African Amer 50 mL/min/1.73      Globulin 3.0 gm/dL      A/G Ratio 1.3 g/dL      BUN/Creatinine Ratio 15.0     Anion Gap 15.2 mmol/L     Narrative:      GFR Normal >60  Chronic Kidney Disease <60  Kidney Failure <15      Procalcitonin [[id number]]  (Normal) Collected: 02/08/21 0100    Specimen: Blood Updated: 02/08/21 0141     Procalcitonin 0.14 ng/mL     Narrative:      As a Marker for Sepsis (Non-Neonates):   1. <0.5 ng/mL represents a low risk of severe sepsis and/or septic shock.  1. >2 ng/mL represents a high risk of severe sepsis and/or septic shock.    As a Marker for Lower Respiratory Tract Infections that require antibiotic therapy:  PCT on Admission     Antibiotic Therapy             6-12 Hrs later  > 0.5                Strongly Recommended            >0.25 - <0.5         Recommended  0.1 - 0.25           Discouraged                   Remeasure/reassess PCT  <0.1                 Strongly Discouraged          Remeasure/reassess PCT      As 28 day mortality risk marker: \"Change in Procalcitonin Result\" (> 80 % or <=80 %) if Day 0 (or Day 1) and Day 4 values are available. Refer to http://www.[employer]-pct-calculator.com/   Change in PCT <=80 %   A decrease of PCT levels below or equal to 80 % defines a positive change in PCT test result representing a higher risk for 28-day all-cause mortality of patients diagnosed with severe sepsis or septic shock.  Change in PCT > 80 %   A decrease of PCT levels of more than 80 % defines a negative change in PCT result representing a lower risk for 28-day all-cause mortality of patients diagnosed with severe sepsis or septic " shock.                Results may be falsely decreased if patient taking Biotin.     Lactic Acid, Plasma [[id number]]  (Abnormal) Collected: 02/08/21 0100    Specimen: Blood Updated: 02/08/21 0129     Lactate 4.4 mmol/L     Blood Culture - Blood, Arm, Right [[id number]] Collected: 02/08/21 0100    Specimen: Blood from Arm, Right Updated: 02/08/21 0106    Troponin [[id number]]  (Normal) Collected: 02/08/21 0100    Specimen: Blood Updated: 02/08/21 0135     Troponin T <0.010 ng/mL     Narrative:      Troponin T Reference Range:  <= 0.03 ng/mL-   Negative for AMI  >0.03 ng/mL-     Abnormal for myocardial necrosis.  Clinicians would have to utilize clinical acumen, EKG, Troponin and serial changes to determine if it is an Acute Myocardial Infarction or myocardial injury due to an underlying chronic condition.       Results may be falsely decreased if patient taking Biotin.      CBC Auto Differential [[id number]]  (Abnormal) Collected: 02/08/21 0100    Specimen: Blood Updated: 02/08/21 0123     WBC 4.26 10*3/mm3      RBC 4.66 10*6/mm3      Hemoglobin 13.4 g/dL      Hematocrit 39.5 %      MCV 84.8 fL      MCH 28.8 pg      MCHC 33.9 g/dL      RDW 12.8 %      RDW-SD 39.0 fl      MPV 10.5 fL      Platelets 195 10*3/mm3      Neutrophil % 89.0 %      Lymphocyte % 7.7 %      Monocyte % 2.6 %      Eosinophil % 0.0 %      Basophil % 0.2 %      Immature Grans % 0.5 %      Neutrophils, Absolute 3.79 10*3/mm3      Lymphocytes, Absolute 0.33 10*3/mm3      Monocytes, Absolute 0.11 10*3/mm3      Eosinophils, Absolute 0.00 10*3/mm3      Basophils, Absolute 0.01 10*3/mm3      Immature Grans, Absolute 0.02 10*3/mm3      nRBC 0.0 /100 WBC     Lactic Acid, Reflex Timer (This will reflex a repeat order 3-3:15 hours after ordered.) [[id number]] Collected: 02/08/21 0100    Specimen: Blood Updated: 02/08/21 0431     Hold Tube Hold for add-ons.     Comment: Auto resulted.       Respiratory Panel PCR w/COVID-19(SARS-CoV-2) PIPE/EVENS/AVA/PAD/COR/MAD/TALYA  In-House, NP Swab in [facility], 3-4 HR TAT - Swab, Nasopharynx [[id number]]  (Abnormal) Collected: 02/08/21 0101    Specimen: Swab from Nasopharynx Updated: 02/08/21 0247     ADENOVIRUS, PCR Not Detected     Coronavirus 229E Not Detected     Coronavirus HKU1 Not Detected     Coronavirus NL63 Not Detected     Coronavirus OC43 Not Detected     COVID19 Detected     Human Metapneumovirus Not Detected     Human Rhinovirus/Enterovirus Not Detected     Influenza A PCR Not Detected     Influenza B PCR Not Detected     Parainfluenza Virus 1 Not Detected     Parainfluenza Virus 2 Not Detected     Parainfluenza Virus 3 Not Detected     Parainfluenza Virus 4 Not Detected     RSV, PCR Not Detected     Bordetella pertussis pcr Not Detected     Bordetella parapertussis PCR Not Detected     Chlamydophila pneumoniae PCR Not Detected     Mycoplasma pneumo by PCR Not Detected    Narrative:      Fact sheet for providers: https://docs.[employer]/wp-content/uploads/ASJ8520-1415-OB3.1-EUA-Provider-Fact-Sheet-3.pdf    Fact sheet for patients: https://docs.[employer]/wp-content/uploads/DGY4633-0359-DM6.1-EUA-Patient-Fact-Sheet-1.pdf    Test performed by PCR.    Blood Culture - Blood, Arm, Left [[id number]] Collected: 02/08/21 0107    Specimen: Blood from Arm, Left Updated: 02/08/21 0110    D-dimer, Quantitative [[id number]]  (Abnormal) Collected: 02/08/21 0133    Specimen: Blood Updated: 02/08/21 0421     D-Dimer, Quantitative 0.57 MCGFEU/mL     Narrative:      The Stago D-Dimer test used in conjunction with a clinical pretest probability (PTP) assessment model, has been approved by the FDA to rule out the presence of venous thromboembolism (VTE) in outpatients suspected of deep venous thrombosis (DVT) or pulmonary embolism (PE). The cut-off for negative predictive value is <0.50 MCGFEU/mL.    Troponin [[id number]]  (Normal) Collected: 02/08/21 0319    Specimen: Blood from Arm, Left Updated: 02/08/21 0353     Troponin T <0.010 ng/mL      Narrative:      Troponin T Reference Range:  <= 0.03 ng/mL-   Negative for AMI  >0.03 ng/mL-     Abnormal for myocardial necrosis.  Clinicians would have to utilize clinical acumen, EKG, Troponin and serial changes to determine if it is an Acute Myocardial Infarction or myocardial injury due to an underlying chronic condition.       Results may be falsely decreased if patient taking Biotin.      Lactic Acid, Reflex [[id number]]  (Abnormal) Collected: 02/08/21 0442    Specimen: Blood Updated: 02/08/21 0504     Lactate 2.2 mmol/L           Imaging Results (Last 24 Hours)     Procedure Component Value Units Date/Time    CT Angiogram Chest [[id number]] Collected: 02/08/21 0517     Updated: 02/08/21 0529    Narrative:      CT ANGIOGRAM OF THE CHEST     HISTORY: Cough. Hypoxia. HISTORY of COVID.     COMPARISON: None available.     TECHNIQUE: Axial CT imaging was obtained through the thorax. IV contrast  was administered. Three-D reformatted images were obtained.     FINDINGS:  The exam is degraded by poor timing of the contrast bolus. No large  central pulmonary thromboembolus is seen. Thoracic aorta is normal in  caliber. There is no evidence of dissection. There are some coronary  artery calcifications. The thyroid gland, trachea, and esophagus appear  unremarkable. Mediastinal lymph nodes do not appear pathologically  enlarged, although the patient does have some prominent hilar lymph  nodes. For example, a right hilar node measures 2.3 x 1 0 cm. Multifocal  pulmonary opacities are seen. Their morphology and distribution is  keeping with history of COVID. The patient has pneumobilia. No acute  abnormalities are seen within the upper abdomen. Gallbladder is  surgically absent. Patient appears to have at least moderate stenosis  celiac axis. No acute osseous abnormalities are seen.       Impression:         1. Examination is degraded by poor timing of the contrast bolus. No  obvious pulmonary thromboembolus is seen.  2.  Bilateral pulmonary opacities. Their distribution and morphology is  in keeping with COVID.  3. Prominent right hilar nodes. Short-term CT follow-up to document  resolution is suggested.     Radiation dose reduction techniques were utilized, including automated  exposure control and exposure modulation based on body size.     This report was finalized on 2/8/2021 5:26 AM by Dr. Aliya Magallon M.D.       XR Chest AP [[id number]] Collected: 02/08/21 0127     Updated: 02/08/21 0132    Narrative:      SINGLE VIEW OF THE CHEST     HISTORY: Cough and fever. Concern for COVID 19.     COMPARISON: None available.     FINDINGS:  The patient has bilateral pulmonary opacities. There appearance is  nonspecific, but certainly suggestive of COVID. Heart size is within  normal limits. Lungs appear clear. There is some blunting of the left  costophrenic angle. Trace effusion is not excluded.       Impression:      Bilateral pulmonary opacities, in a peripheral and basilar predominant  distribution. Appearance is suggestive of COVID. Correlation with  testing is recommended.     This report was finalized on 2/8/2021 1:28 AM by Dr. Aliya Magallon M.D.                  ECG 12 Lead   Final Result   HEART RATE= 107  bpm   RR Interval= 564  ms   NM Interval= 171  ms   P Horizontal Axis= 8  deg   P Front Axis= 49  deg   QRSD Interval= 92  ms   QT Interval= 340  ms   QRS Axis= -10  deg   T Wave Axis= -55  deg   - OTHERWISE NORMAL ECG -   Sinus tachycardia   NO PRIOR TRACING AVAILABLE FOR COMPARISON   Electronically Signed By: Zoe Vang ([facility]) 08-Feb-2021 10:44:07   Date and Time of Study: 2021-02-08 00:57:48           Assessment/Plan     Active Hospital Problems    Diagnosis  POA   • **Pneumonia due to COVID-19 virus [U07.1, J12.82]  Yes   • HTN (hypertension) [I10]  Yes   • BPH without obstruction/lower urinary tract symptoms [N40.0]  Unknown   • GERD without esophagitis [K21.9]  Unknown      Resolved Hospital Problems   No  resolved problems to display.     · COVID-19 PNA: supportive care, got a dose of decadron but will discontinue as the patient has been symptomatic for two weeks now and does not have any oxygen requirements. Trend inflammatory markers, CTA negative for PE, blood cultures pending. Continue IVF until lactate <2.  · HTN: atenolol and norvasc resumed, hold losartan given LORRI.  · possibly some LORRI, no previous creatinine to compare to, will monitor. avoid nephrotoxic and hypotension. continue IVFs for now. PVR and bladder scan as needed.   · I discussed the patient's findings and my recommendations with patient and nursing staff.    VTE Prophylaxis - SCDs.  Code Status - Full code.       CHELSEY Toscano  [facility]  02/08/21  11:04 EST

## 2021-02-08 NOTE — PLAN OF CARE
Goal Outcome Evaluation:  Plan of Care Reviewed With: patient     Outcome Summary: Pt admitted from ED on RA.  COVID sx x2 weeks.  Pt voids per urinal.  Daughter to remain at bedside in PPE

## 2021-02-08 NOTE — PROGRESS NOTES
"[facility] Clinical Pharmacy Services: Enoxaparin Dosing    Pharmacy has been consulted to dose enoxaparin for Martir Porter for an indication of VTE prophylaxis per CHELSEY Silva' request.    Relevant clinical data and objective history reviewed:  70 y.o. male 182.9 cm (72\") 108 kg (237 lb 8 oz)    Home Anticoagulation/Antiplatelet: None    Lab Results   Component Value Date    WBC 4.26 02/08/2021    HGB 13.4 02/08/2021    HCT 39.5 02/08/2021    MCV 84.8 02/08/2021     02/08/2021     Lab Results   Component Value Date    GLUCOSE 195 (H) 02/08/2021    CALCIUM 8.9 02/08/2021     02/08/2021    K 4.3 02/08/2021    CO2 21.8 (L) 02/08/2021    CL 99 02/08/2021    BUN 21 02/08/2021    CREATININE 1.40 (H) 02/08/2021    EGFRIFNONA 50 (L) 02/08/2021    BCR 15.0 02/08/2021    ANIONGAP 15.2 (H) 02/08/2021     Estimated Creatinine Clearance: 60.6 mL/min (A) (by C-G formula based on SCr of 1.4 mg/dL (H)).    Assessment/Plan    1. Will start the patient on an enoxaparin dose of 40 mg SC every 24 hours based on the indication of VTE prophylaxis and patient's renal function (CrCl >30 mL/min).    2. Platelet count is currently 195 at baseline. Platelets should be drawn every 3 days while the patient is on enoxaparin per system policy.    3. Pharmacy will discontinue the Pharmacy to Dose consult at this time. Renal function will continue to be monitored and dosing adjustments will be made by pharmacy based on renal function if necessary.    Thank you for allowing me to participate in your patient's care. Please call pharmacy with any questions or concerns.  Goldie Smith, Pharm.D., [facility]   Clinical Staff Pharmacist  "

## 2021-02-09 ENCOUNTER — APPOINTMENT (OUTPATIENT)
Dept: GENERAL RADIOLOGY | Facility: HOSPITAL | Age: 71
End: 2021-02-09

## 2021-02-09 PROBLEM — R04.2 HEMOPTYSIS: Status: ACTIVE | Noted: 2021-02-09

## 2021-02-09 LAB
ANION GAP SERPL CALCULATED.3IONS-SCNC: 9.3 MMOL/L (ref 5–15)
BUN SERPL-MCNC: 25 MG/DL (ref 8–23)
BUN/CREAT SERPL: 25.3 (ref 7–25)
CALCIUM SPEC-SCNC: 8.7 MG/DL (ref 8.6–10.5)
CHLORIDE SERPL-SCNC: 107 MMOL/L (ref 98–107)
CO2 SERPL-SCNC: 21.7 MMOL/L (ref 22–29)
CREAT SERPL-MCNC: 0.99 MG/DL (ref 0.76–1.27)
CRP SERPL-MCNC: 1.61 MG/DL (ref 0–0.5)
D-LACTATE SERPL-SCNC: 1.8 MMOL/L (ref 0.5–2)
DEPRECATED RDW RBC AUTO: 40.4 FL (ref 37–54)
ERYTHROCYTE [DISTWIDTH] IN BLOOD BY AUTOMATED COUNT: 13.1 % (ref 12.3–15.4)
GFR SERPL CREATININE-BSD FRML MDRD: 74 ML/MIN/1.73
GLUCOSE SERPL-MCNC: 114 MG/DL (ref 65–99)
HCT VFR BLD AUTO: 36.3 % (ref 37.5–51)
HGB BLD-MCNC: 12.3 G/DL (ref 13–17.7)
MCH RBC QN AUTO: 28.7 PG (ref 26.6–33)
MCHC RBC AUTO-ENTMCNC: 33.9 G/DL (ref 31.5–35.7)
MCV RBC AUTO: 84.6 FL (ref 79–97)
NT-PROBNP SERPL-MCNC: 2785 PG/ML (ref 0–900)
PLATELET # BLD AUTO: 212 10*3/MM3 (ref 140–450)
PMV BLD AUTO: 10.5 FL (ref 6–12)
POTASSIUM SERPL-SCNC: 4.2 MMOL/L (ref 3.5–5.2)
PROCALCITONIN SERPL-MCNC: 0.1 NG/ML (ref 0–0.25)
RBC # BLD AUTO: 4.29 10*6/MM3 (ref 4.14–5.8)
SODIUM SERPL-SCNC: 138 MMOL/L (ref 136–145)
WBC # BLD AUTO: 11.92 10*3/MM3 (ref 3.4–10.8)

## 2021-02-09 PROCEDURE — 36415 COLL VENOUS BLD VENIPUNCTURE: CPT | Performed by: NURSE PRACTITIONER

## 2021-02-09 PROCEDURE — 63710000001 DEXAMETHASONE PER 0.25 MG: Performed by: INTERNAL MEDICINE

## 2021-02-09 PROCEDURE — 86140 C-REACTIVE PROTEIN: CPT | Performed by: INTERNAL MEDICINE

## 2021-02-09 PROCEDURE — 80048 BASIC METABOLIC PNL TOTAL CA: CPT | Performed by: NURSE PRACTITIONER

## 2021-02-09 PROCEDURE — 83605 ASSAY OF LACTIC ACID: CPT | Performed by: NURSE PRACTITIONER

## 2021-02-09 PROCEDURE — 71045 X-RAY EXAM CHEST 1 VIEW: CPT

## 2021-02-09 PROCEDURE — 83880 ASSAY OF NATRIURETIC PEPTIDE: CPT | Performed by: INTERNAL MEDICINE

## 2021-02-09 PROCEDURE — 94762 N-INVAS EAR/PLS OXIMTRY CONT: CPT

## 2021-02-09 PROCEDURE — 84145 PROCALCITONIN (PCT): CPT | Performed by: INTERNAL MEDICINE

## 2021-02-09 PROCEDURE — 85027 COMPLETE CBC AUTOMATED: CPT | Performed by: NURSE PRACTITIONER

## 2021-02-09 PROCEDURE — 25010000002 FUROSEMIDE PER 20 MG: Performed by: INTERNAL MEDICINE

## 2021-02-09 RX ORDER — FUROSEMIDE 10 MG/ML
20 INJECTION INTRAMUSCULAR; INTRAVENOUS ONCE
Status: COMPLETED | OUTPATIENT
Start: 2021-02-09 | End: 2021-02-09

## 2021-02-09 RX ADMIN — ATENOLOL 50 MG: 50 TABLET ORAL at 08:54

## 2021-02-09 RX ADMIN — SODIUM CHLORIDE, PRESERVATIVE FREE 10 ML: 5 INJECTION INTRAVENOUS at 08:54

## 2021-02-09 RX ADMIN — FUROSEMIDE 20 MG: 10 INJECTION, SOLUTION INTRAMUSCULAR; INTRAVENOUS at 17:34

## 2021-02-09 RX ADMIN — PANTOPRAZOLE SODIUM 40 MG: 40 TABLET, DELAYED RELEASE ORAL at 07:06

## 2021-02-09 RX ADMIN — SODIUM CHLORIDE, PRESERVATIVE FREE 10 ML: 5 INJECTION INTRAVENOUS at 22:02

## 2021-02-09 RX ADMIN — TRAMADOL HYDROCHLORIDE 100 MG: 50 TABLET, FILM COATED ORAL at 07:06

## 2021-02-09 RX ADMIN — DEXAMETHASONE 6 MG: 4 TABLET ORAL at 13:50

## 2021-02-09 RX ADMIN — AMLODIPINE BESYLATE 5 MG: 5 TABLET ORAL at 08:54

## 2021-02-09 RX ADMIN — SODIUM CHLORIDE 75 ML/HR: 9 INJECTION, SOLUTION INTRAVENOUS at 05:34

## 2021-02-09 NOTE — PROGRESS NOTES
Bilateral lower extremity venous doppler preliminary negative for acute DVT. Verbal report given on the floor to Shaylee Candelario RN.

## 2021-02-09 NOTE — PROGRESS NOTES
Name: Martir Porter ADMIT: 2021   : 1950  PCP: Sarwat Iniguez MD    MRN: [id number] LOS: 1 days   AGE/SEX: 70 y.o. male  ROOM: [facility], [place]   Subjective   Chief Complaint   Patient presents with   • Fever   • Cough     A little worse dyspnea today  Has been on IVFs  Some hemoptysis  Some LE edema    ROS  No f/c  No n/v  No cp/palp  +soa/cough    Objective   Vital Signs  Temp:  [97.1 °F (36.2 °C)-97.6 °F (36.4 °C)] 97.2 °F (36.2 °C)  Heart Rate:  [] 78  Resp:  [18] 18  BP: (112-154)/(57-89) 135/75  SpO2:  [92 %-96 %] 94 %  on   ;   Device (Oxygen Therapy): room air  Body mass index is 31.07 kg/m².    Physical Exam  Constitutional:       Appearance: He is well-developed.   HENT:      Head: Normocephalic and atraumatic.   Eyes:      General: No scleral icterus.  Neck:      Musculoskeletal: Neck supple.   Cardiovascular:      Rate and Rhythm: Normal rate and regular rhythm.      Heart sounds: Normal heart sounds.   Pulmonary:      Effort: Respiratory distress (mild) present.      Breath sounds: Normal breath sounds. No wheezing.   Abdominal:      General: There is no distension.      Palpations: Abdomen is soft.      Tenderness: There is no abdominal tenderness.   Musculoskeletal:      Right lower leg: Edema present.      Left lower leg: Edema present.   Neurological:      Mental Status: He is alert.   Psychiatric:         Behavior: Behavior normal.         Results Review:       I reviewed the patient's new clinical results.  Results from last 7 days   Lab Units 21  0611 21  0100   WBC 10*3/mm3 11.92* 4.26   HEMOGLOBIN g/dL 12.3* 13.4   PLATELETS 10*3/mm3 212 195     Results from last 7 days   Lab Units 21  0611 21  0100   SODIUM mmol/L 138 136   POTASSIUM mmol/L 4.2 4.3   CHLORIDE mmol/L 107 99   CO2 mmol/L 21.7* 21.8*   BUN mg/dL 25* 21   CREATININE mg/dL 0.99 1.40*   GLUCOSE mg/dL 114* 195*   Estimated Creatinine Clearance: 84.1 mL/min (by C-G formula based on SCr of  0.99 mg/dL).  Results from last 7 days   Lab Units 02/08/21  0100   ALBUMIN g/dL 4.00   BILIRUBIN mg/dL 0.5   ALK PHOS U/L 70   AST (SGOT) U/L 23   ALT (SGPT) U/L 27     Results from last 7 days   Lab Units 02/09/21  0611 02/08/21  0100   CALCIUM mg/dL 8.7 8.9   ALBUMIN g/dL  --  4.00     Results from last 7 days   Lab Units 02/09/21  0611 02/08/21  0442 02/08/21  0100   PROCALCITONIN ng/mL 0.10  --  0.14   LACTATE mmol/L 1.8 2.2* 4.4*       Coag     HbA1C No results found for: HGBA1C  Infection   Results from last 7 days   Lab Units 02/09/21  0611 02/08/21  0107 02/08/21  0100   BLOODCX   --  No growth at 24 hours No growth at 24 hours   PROCALCITONIN ng/mL 0.10  --  0.14     Radiology(recent) Ct Angiogram Chest    Result Date: 2/8/2021   1. Examination is degraded by poor timing of the contrast bolus. No obvious pulmonary thromboembolus is seen. 2. Bilateral pulmonary opacities. Their distribution and morphology is in keeping with COVID. 3. Prominent right hilar nodes. Short-term CT follow-up to document resolution is suggested.  Radiation dose reduction techniques were utilized, including automated exposure control and exposure modulation based on body size.  This report was finalized on 2/8/2021 5:26 AM by Dr. Aliya Magallon M.D.      Xr Chest Ap    Result Date: 2/8/2021  Bilateral pulmonary opacities, in a peripheral and basilar predominant distribution. Appearance is suggestive of COVID. Correlation with testing is recommended.  This report was finalized on 2/8/2021 1:28 AM by Dr. Aliya Magallon M.D.      Troponin T   Date Value Ref Range Status   02/08/2021 <0.010 0.000 - 0.030 ng/mL Final     No components found for: TSH;2    amLODIPine, 5 mg, Oral, Daily  atenolol, 50 mg, Oral, Q24H  dexamethasone, 6 mg, Oral, Daily With Breakfast  pantoprazole, 40 mg, Oral, QAM  sodium chloride, 500 mL, Intravenous, Once  sodium chloride, 10 mL, Intravenous, Q12H  traMADol, 100 mg, Oral, QAM       Diet  Regular      Assessment/Plan      Active Hospital Problems    Diagnosis  POA   • **Pneumonia due to COVID-19 virus [U07.1, J12.82]  Yes   • Hemoptysis [R04.2]  Yes   • HTN (hypertension) [I10]  Yes   • BPH without obstruction/lower urinary tract symptoms [N40.0]  Yes   • GERD without esophagitis [K21.9]  Yes      Resolved Hospital Problems   No resolved problems to display.       · With sats 92% during exam and reports that dropped to 88% earlier will restart dexamethasone. Check overnight oximetry  · Slight hemoptysis. Previous testing negative for VTE. Pt requests to stop lovenox. Have ordered SCDs.   · Above BP meds  · Stop IVFs with improvement in Cr, check CXR to make sure no chf      DW staff  DW family      Mark Garcia MD  [facility]  02/09/21  11:36 EST

## 2021-02-09 NOTE — PROGRESS NOTES
Discharge Planning Assessment  [facility]     Patient Name: Martir Porter  MRN: [id number]  Today's Date: 2/9/2021    Admit Date: 2/8/2021    Discharge Needs Assessment     Row Name 02/09/21 0951       Living Environment    Lives With  significant other    Current Living Arrangements  home/apartment/condo    Potentially Unsafe Housing Conditions  unable to assess    Primary Care Provided by  self    Provides Primary Care For  no one    Able to Return to Prior Arrangements  yes       Resource/Environmental Concerns    Resource/Environmental Concerns  none    Transportation Concerns  car, none       Transition Planning    Patient/Family Anticipates Transition to  home;home with family    Patient/Family Anticipated Services at Transition  none    Transportation Anticipated  family or friend will provide       Discharge Needs Assessment    Equipment Currently Used at Home  none    Concerns to be Addressed  denies needs/concerns at this time    Anticipated Changes Related to Illness  none    Equipment Needed After Discharge  none    Provided Post Acute Provider List?  N/A    Provided Post Acute Provider Quality & Resource List?  N/A        Discharge Plan     Row Name 02/09/21 0952       Plan    Plan  D/C home with spouse.    Provided Post Acute Provider List?  N/A    Provided Post Acute Provider Quality & Resource List?  N/A    Patient/Family in Agreement with Plan  unable to assess    Plan Comments  D/C home. Called into patient’s room due isolation precautions, introduced self & role, patient verified address, PCP & pharmacy ([facility] in [place]) denies issues obtaining or paying for prescriptions. Patient’s home is a single story home with no steps to enter or exit, denies issues ambulating within the home or entering/exiting the home. Patient is independent in all areas of living & denies use of DME in home or past use of HH or SNF. Patient states will have to discuss with daughter, who is an APRN, on  preference of HH agency or SNF if it is determined patient is in need of such services. Patient’s wife or daughter can transport at discharge. CCP to follow. Guerda Sifuentes RN.        Continued Care and Services - Admitted Since 2/8/2021    Coordination has not been started for this encounter.         Demographic Summary     Row Name 02/09/21 0949       General Information    Admission Type  inpatient    Arrived From  emergency department    Referral Source  admission list    Reason for Consult  discharge planning    Preferred Language  English     Used During This Interaction  no        Functional Status     Row Name 02/09/21 0150       Functional Status    Usual Activity Tolerance  excellent    Current Activity Tolerance  excellent       Functional Status, IADL    Medications  independent    Meal Preparation  independent    Housekeeping  independent    Laundry  independent    Shopping  independent        Psychosocial    No documentation.       Abuse/Neglect    No documentation.       Legal    No documentation.       Substance Abuse    No documentation.       Patient Forms    No documentation.           Staci Sifuentes RN

## 2021-02-09 NOTE — PLAN OF CARE
Goal Outcome Evaluation:  Plan of Care Reviewed With: patient  Progress: improving  Outcome Summary: Patient's daughter has remained at bedside throughout shift. No complaints from patient. Vitals stable on room air. Will continue to monitor.

## 2021-02-09 NOTE — PLAN OF CARE
Goal Outcome Evaluation:        Outcome Summary: No complaints from pateint.  Daughter currently at bedside and attentive to patient.  Daughter possibly leaving tonight.  VSS.  Possible D/c tomorrow.  Patient received Lasix & Lovenox.  Fluids discontinued.  Will continue to monitor.

## 2021-02-10 ENCOUNTER — READMISSION MANAGEMENT (OUTPATIENT)
Dept: CALL CENTER | Facility: HOSPITAL | Age: 71
End: 2021-02-10

## 2021-02-10 VITALS
RESPIRATION RATE: 18 BRPM | HEIGHT: 72 IN | WEIGHT: 229.1 LBS | OXYGEN SATURATION: 92 % | SYSTOLIC BLOOD PRESSURE: 147 MMHG | HEART RATE: 69 BPM | TEMPERATURE: 96.9 F | DIASTOLIC BLOOD PRESSURE: 80 MMHG | BODY MASS INDEX: 31.03 KG/M2

## 2021-02-10 PROBLEM — D89.831 CYTOKINE RELEASE SYNDROME, GRADE 1: Status: ACTIVE | Noted: 2021-02-10

## 2021-02-10 PROBLEM — J81.0 ACUTE PULMONARY EDEMA: Status: ACTIVE | Noted: 2021-02-10

## 2021-02-10 LAB
ALBUMIN SERPL-MCNC: 3.3 G/DL (ref 3.5–5.2)
ALBUMIN/GLOB SERPL: 1.3 G/DL
ALP SERPL-CCNC: 57 U/L (ref 39–117)
ALT SERPL W P-5'-P-CCNC: 34 U/L (ref 1–41)
ANION GAP SERPL CALCULATED.3IONS-SCNC: 8.8 MMOL/L (ref 5–15)
AST SERPL-CCNC: 49 U/L (ref 1–40)
BILIRUB SERPL-MCNC: 0.5 MG/DL (ref 0–1.2)
BUN SERPL-MCNC: 25 MG/DL (ref 8–23)
BUN/CREAT SERPL: 27.8 (ref 7–25)
CALCIUM SPEC-SCNC: 8.9 MG/DL (ref 8.6–10.5)
CHLORIDE SERPL-SCNC: 102 MMOL/L (ref 98–107)
CO2 SERPL-SCNC: 25.2 MMOL/L (ref 22–29)
CREAT SERPL-MCNC: 0.9 MG/DL (ref 0.76–1.27)
CRP SERPL-MCNC: 2.29 MG/DL (ref 0–0.5)
DEPRECATED RDW RBC AUTO: 39.2 FL (ref 37–54)
ERYTHROCYTE [DISTWIDTH] IN BLOOD BY AUTOMATED COUNT: 13 % (ref 12.3–15.4)
GFR SERPL CREATININE-BSD FRML MDRD: 83 ML/MIN/1.73
GLOBULIN UR ELPH-MCNC: 2.5 GM/DL
GLUCOSE SERPL-MCNC: 131 MG/DL (ref 65–99)
HCT VFR BLD AUTO: 35.2 % (ref 37.5–51)
HGB BLD-MCNC: 12.1 G/DL (ref 13–17.7)
MCH RBC QN AUTO: 28.5 PG (ref 26.6–33)
MCHC RBC AUTO-ENTMCNC: 34.4 G/DL (ref 31.5–35.7)
MCV RBC AUTO: 82.8 FL (ref 79–97)
PLATELET # BLD AUTO: 215 10*3/MM3 (ref 140–450)
PMV BLD AUTO: 10.3 FL (ref 6–12)
POTASSIUM SERPL-SCNC: 3.9 MMOL/L (ref 3.5–5.2)
PROCALCITONIN SERPL-MCNC: 0.1 NG/ML (ref 0–0.25)
PROT SERPL-MCNC: 5.8 G/DL (ref 6–8.5)
RBC # BLD AUTO: 4.25 10*6/MM3 (ref 4.14–5.8)
SODIUM SERPL-SCNC: 136 MMOL/L (ref 136–145)
WBC # BLD AUTO: 7.18 10*3/MM3 (ref 3.4–10.8)

## 2021-02-10 PROCEDURE — 80053 COMPREHEN METABOLIC PANEL: CPT | Performed by: INTERNAL MEDICINE

## 2021-02-10 PROCEDURE — 86140 C-REACTIVE PROTEIN: CPT | Performed by: INTERNAL MEDICINE

## 2021-02-10 PROCEDURE — 63710000001 DEXAMETHASONE PER 0.25 MG: Performed by: INTERNAL MEDICINE

## 2021-02-10 PROCEDURE — 85027 COMPLETE CBC AUTOMATED: CPT | Performed by: INTERNAL MEDICINE

## 2021-02-10 PROCEDURE — 84145 PROCALCITONIN (PCT): CPT | Performed by: INTERNAL MEDICINE

## 2021-02-10 RX ORDER — DEXAMETHASONE 6 MG/1
6 TABLET ORAL
Qty: 3 TABLET | Refills: 0 | Status: SHIPPED | OUTPATIENT
Start: 2021-02-11 | End: 2021-02-14

## 2021-02-10 RX ORDER — FUROSEMIDE 20 MG/1
20 TABLET ORAL DAILY PRN
Qty: 7 TABLET | Refills: 0 | Status: SHIPPED | OUTPATIENT
Start: 2021-02-10 | End: 2022-03-25

## 2021-02-10 RX ADMIN — ATENOLOL 50 MG: 50 TABLET ORAL at 08:01

## 2021-02-10 RX ADMIN — ACETAMINOPHEN 650 MG: 325 TABLET, FILM COATED ORAL at 04:53

## 2021-02-10 RX ADMIN — PANTOPRAZOLE SODIUM 40 MG: 40 TABLET, DELAYED RELEASE ORAL at 06:18

## 2021-02-10 RX ADMIN — DEXAMETHASONE 6 MG: 4 TABLET ORAL at 07:59

## 2021-02-10 RX ADMIN — AMLODIPINE BESYLATE 5 MG: 5 TABLET ORAL at 08:01

## 2021-02-10 RX ADMIN — SODIUM CHLORIDE, PRESERVATIVE FREE 10 ML: 5 INJECTION INTRAVENOUS at 08:01

## 2021-02-10 RX ADMIN — TRAMADOL HYDROCHLORIDE 100 MG: 50 TABLET, FILM COATED ORAL at 06:18

## 2021-02-10 NOTE — PROGRESS NOTES
Case Management Discharge Note      Final Note: DC'D home. Lacey Walls RN    Provided Post Acute Provider List?: N/A  Provided Post Acute Provider Quality & Resource List?: N/A    Selected Continued Care - Discharged on 2/10/2021 Admission date: 2/8/2021 - Discharge disposition: Home or Self Care    Destination    No services have been selected for the patient.              Durable Medical Equipment    No services have been selected for the patient.              Dialysis/Infusion    No services have been selected for the patient.              Home Medical Care    No services have been selected for the patient.              Therapy    No services have been selected for the patient.              Community Resources    No services have been selected for the patient.                  Transportation Services  Private: Car    Final Discharge Disposition Code: 01 - home or self-care

## 2021-02-10 NOTE — DISCHARGE SUMMARY
NAME: Martir Porter ADMIT: 2021   : 1950  PCP: Sarwat Iniguez MD    MRN: [id number] LOS: 2 days   AGE/SEX: 70 y.o. male  ROOM: [facility]/     Date of Admission:  2021  Date of Discharge:  2/10/2021    PCP: Sarwat Iniguez MD    CHIEF COMPLAINT  Fever and Cough      DISCHARGE DIAGNOSIS  Active Hospital Problems    Diagnosis  POA   • **Pneumonia due to COVID-19 virus [U07.1, J12.82]  Yes   • Cytokine release syndrome, grade 1 [D89.831]  Yes   • Acute pulmonary edema (CMS/HCC) [J81.0]  No   • Hemoptysis [R04.2]  Yes   • HTN (hypertension) [I10]  Yes   • BPH without obstruction/lower urinary tract symptoms [N40.0]  Yes   • GERD without esophagitis [K21.9]  Yes      Resolved Hospital Problems   No resolved problems to display.       SECONDARY DIAGNOSES  Past Medical History:   Diagnosis Date   • COPD (chronic obstructive pulmonary disease) (CMS/HCC)     mild   • GERD (gastroesophageal reflux disease)    • Hypertension    • Pneumonia      HOSPITAL COURSE  Patient is a 70 y.o. male with history of HTN, GERD, BPH, possible some mild CKD who presented with dyspnea.  His family members were recently diagnosed with Covid.  He probably had been sick for about 10 to 14 days.    Patient was admitted to the hospital.  He did have elevated lactic acid and perhaps mild LORRI and was initially given IV fluids.  O2 sats were borderline and he was started on dexamethasone.  He did have some acute pulmonary edema likely related to the fluids and potential kidney disease.  He was given 1 dose of IV Lasix with improvement.    He had lower extremity Doppler which was negative for DVT and CT angio of the chest which did not show pulmonary embolism.  There was some Prominent right hilar nodes. Short-term CT follow-up to document   resolution is suggested with Sarwat Iniguez MD.     There was no evidence of any bacterial infection while here and so no additional antibiotics have been given.  He feels much  better and is ready for discharge home.  They will monitor his symptoms closely but I suspect he will continue to get better.  He is going to take another p.o. dose of Lasix at home today and gave him a small as needed prescription for Lasix if he develops additional fluid.  If that is the case then he is to call Sarwat Iniguez MD.  No indication for 2D echo currently especially with his Covid pneumonia and the fact they would not be able to use the better of the echo machines, but it would be reasonable for him to get a 2D echo after he is recovered from COVID-19.    DIAGNOSTICS    Duplex Venous Lower Extremity - Bilateral CAR [[id number]] Paramjit as Reviewed   Order Status: Completed Collected: 02/08/21 1752    Updated: 02/08/21 1938    Right Common Femoral Spont Y    Right Common Femoral Phasic Y    Right Common Femoral Augment Y    Right Common Femoral Competent Y    Right Common Femoral Compress C    Right Saphenofemoral Junction Compress C    Right Profunda Femoral Compress C    Right Proximal Femoral Compress C    Right Mid Femoral Spont Y    Right Mid Femoral Phasic Y    Right Mid Femoral Augment Y    Right Mid Femoral Competent Y    Right Mid Femoral Compress C    Right Distal Femoral Compress C    Right Popliteal Spont Y    Right Popliteal Phasic Y    Right Popliteal Augment Y    Right Popliteal Competent Y    Right Popliteal Compress C    Right Posterior Tibial Compress C    Right Peroneal Compress C    Right GastronemiusSoleal Compress C    Right Greater Saph AK Compress C    Right Greater Saph BK Compress C    Right Lesser Saph Compress C    Left Common Femoral Spont Y    Left Common Femoral Phasic Y    Left Common Femoral Augment Y    Left Common Femoral Competent Y    Left Common Femoral Compress C    Left Saphenofemoral Junction Compress C    Left Profunda Femoral Compress C    Left Proximal Femoral Compress C    Left Mid Femoral Spont Y    Left Mid Femoral Phasic Y    Left Mid Femoral Augment Y     Left Mid Femoral Competent Y    Left Mid Femoral Compress C    Left Distal Femoral Compress C    Left Popliteal Spont Y    Left Popliteal Phasic Y    Left Popliteal Augment Y    Left Popliteal Competent Y    Left Popliteal Compress C    Left Posterior Tibial Compress C    Left Peroneal Compress C    Left GastronemiusSoleal Compress C    Left Greater Saph AK Compress C    Left Greater Saph BK Compress C    Left Lesser Saph Compress C   Narrative:     · Normal bilateral lower extremity venous duplex scan.       CT Angiogram Chest [[id number]] Paramjit as Reviewed   Order Status: Completed Collected: 02/08/21 0517    Updated: 02/08/21 0529   Narrative:     CT ANGIOGRAM OF THE CHEST       HISTORY: Cough. Hypoxia. HISTORY of COVID.       COMPARISON: None available.       TECHNIQUE: Axial CT imaging was obtained through the thorax. IV contrast   was administered. Three-D reformatted images were obtained.       FINDINGS:   The exam is degraded by poor timing of the contrast bolus. No large   central pulmonary thromboembolus is seen. Thoracic aorta is normal in   caliber. There is no evidence of dissection. There are some coronary   artery calcifications. The thyroid gland, trachea, and esophagus appear   unremarkable. Mediastinal lymph nodes do not appear pathologically   enlarged, although the patient does have some prominent hilar lymph   nodes. For example, a right hilar node measures 2.3 x 1 0 cm. Multifocal   pulmonary opacities are seen. Their morphology and distribution is   keeping with history of COVID. The patient has pneumobilia. No acute   abnormalities are seen within the upper abdomen. Gallbladder is   surgically absent. Patient appears to have at least moderate stenosis   celiac axis. No acute osseous abnormalities are seen.       Impression:         1. Examination is degraded by poor timing of the contrast bolus. No   obvious pulmonary thromboembolus is seen.   2. Bilateral pulmonary opacities. Their  distribution and morphology is   in keeping with COVID.   3. Prominent right hilar nodes. Short-term CT follow-up to document   resolution is suggested.          Collected Updated Procedure Result Status    02/10/2021 0559 02/10/2021 0641 CBC (No Diff) [[id number]]   (Abnormal)   Blood    Final result Component Value Units   WBC 7.18 10*3/mm3   RBC 4.25 10*6/mm3   Hemoglobin 12.1Low  g/dL   Hematocrit 35.2Low  %   MCV 82.8 fL   MCH 28.5 pg   MCHC 34.4 g/dL   RDW 13.0 %   RDW-SD 39.2 fl   MPV 10.3 fL   Platelets 215 10*3/mm3           02/10/2021 0559 02/10/2021 0731 C-reactive Protein [[id number]]   (Abnormal)   Blood from Arm, Left    Final result Component Value Units   C-Reactive Protein 2.29High  mg/dL           02/10/2021 0559 02/10/2021 0731 Comprehensive Metabolic Panel [[id number]]    (Abnormal)   Blood from Arm, Left    Final result Component Value Units   Glucose 131High  mg/dL   BUN 25High  mg/dL   Creatinine 0.90 mg/dL   Sodium 136 mmol/L   Potassium 3.9 mmol/L   Chloride 102 mmol/L   CO2 25.2 mmol/L   Calcium 8.9 mg/dL   Total Protein 5.8Low  g/dL   Albumin 3.30Low  g/dL   ALT (SGPT) 34 U/L   AST (SGOT) 49High  U/L   Alkaline Phosphatase 57 U/L   Total Bilirubin 0.5 mg/dL   eGFR Non African Am 83 mL/min/1.73   Globulin 2.5 gm/dL   A/G Ratio 1.3 g/dL   BUN/Creatinine Ratio 27.8High     Anion Gap 8.8 mmol/L           02/10/2021 0559 02/10/2021 0730 Procalcitonin [[id number]]    Blood from Arm, Left    Final result Component Value Units   Procalcitonin 0.10 ng/mL            PHYSICAL EXAM  Objective    Alert  nad  No resp distress  Lungs fairly clear, minimal crackle at bases  No resp distress    CONDITION ON DISCHARGE  Stable.      DISCHARGE DISPOSITION   Home or Self Care      DISCHARGE MEDICATIONS       Your medication list      START taking these medications      Instructions Last Dose Given Next Dose Due   dexamethasone 6 MG tablet  Commonly known as: DECADRON  Start taking on: February 11, 2021      Take 1  tablet by mouth Daily With Breakfast for 3 doses.       furosemide 20 MG tablet  Commonly known as: Lasix      Take 1 tablet by mouth Daily As Needed (as needed for leg swelling. If take more than 2 days in a row call PCP).          CONTINUE taking these medications      Instructions Last Dose Given Next Dose Due   amLODIPine 5 MG tablet  Commonly known as: NORVASC      Take 5 mg by mouth Daily.       atenolol 50 MG tablet  Commonly known as: TENORMIN      atenolol 50 mg tablet       HYDROcodone-acetaminophen 5-325 MG per tablet  Commonly known as: NORCO      hydrocodone 5 mg-acetaminophen 325 mg tablet       losartan 50 MG tablet  Commonly known as: COZAAR      Take 50 mg by mouth Daily.       omeprazole 40 MG capsule  Commonly known as: priLOSEC      omeprazole 40 mg capsule,delayed release       traMADol 50 MG tablet  Commonly known as: ULTRAM      tramadol 50 mg tablet          STOP taking these medications    levoFLOXacin 500 MG tablet  Commonly known as: LEVAQUIN              Where to Get Your Medications      These medications were sent to [address] [address]    Hours: 7:00AM-6PM Mon-Fri Phone: [phone]   · dexamethasone 6 MG tablet  · furosemide 20 MG tablet        No future appointments.  Additional Instructions for the Follow-ups that You Need to Schedule     Discharge Follow-up with PCP   As directed       Currently Documented PCP:    Sarwat Iniguez MD    PCP Phone Number:    [phone]     Follow Up Details: 2-3 weeks           Follow-up Information     Sarwat Iniguez MD .    Specialty: Internal Medicine  Why: 2-3 weeks  Contact information:  [address] [facility] [address] [phone]                   TEST  RESULTS PENDING AT DISCHARGE  Pending Labs     Order Current Status    Blood Culture - Blood, Arm, Left Preliminary result    Blood Culture - Blood, Arm, Right Preliminary result             Mark Garcia MD  [facility]  02/10/21  12:33 EST      Time: greater than 32 minutes on discharge  It was a pleasure taking care of this patient while in the hospital.

## 2021-02-10 NOTE — OUTREACH NOTE
Prep Survey      Responses   [facility] facility patient discharged from?  [place]   Is LACE score < 7 ?  Yes   Emergency Room discharge w/ pulse ox?  No   Eligibility  Readm Mgmt   Discharge diagnosis  Pneumonia due to COVID-19 virus   Does the patient have one of the following disease processes/diagnoses(primary or secondary)?  COVID-19   Does the patient have Home health ordered?  No   Is there a DME ordered?  No   Prep survey completed?  Yes          Bri Alexander RN

## 2021-02-11 ENCOUNTER — READMISSION MANAGEMENT (OUTPATIENT)
Dept: CALL CENTER | Facility: HOSPITAL | Age: 71
End: 2021-02-11

## 2021-02-11 NOTE — OUTREACH NOTE
COVID-19 Week 1 Survey      Responses   [facility] [place] patient discharged from?  [place]   Does the patient have one of the following disease processes/diagnoses(primary or secondary)?  COVID-19   COVID-19 underlying condition?  None   Call Number  Call 1   Week 1 Call successful?  Yes   Call start time  1020   Call end time  1025   Discharge diagnosis  Pneumonia due to COVID-19 virus   Meds reviewed with patient/caregiver?  Yes   Is the patient having any side effects they believe may be caused by any medication additions or changes?  No   Does the patient have all medications ordered at discharge?  Yes   Is the patient taking all medications as directed (includes completed medication regime)?  Yes   Does the patient have a primary care provider?   Yes   Does the patient have an appointment with their PCP or specialist within 7 days of discharge?  No   What is preventing the patient from scheduling follow up appointments within 7 days of discharge?  Haven't had time   Nursing Interventions  Educated patient on importance of making appointment, Advised patient to make appointment   Has the patient kept scheduled appointments due by today?  N/A   Has home health visited the patient within 72 hours of discharge?  N/A   Did the patient receive a copy of their discharge instructions?  Yes   Did the patient receive a copy of COVID-19 specific instructions?  Yes   Nursing interventions  Reviewed instructions with patient   What is the patient's perception of their health status since discharge?  Improving   Does the patient have any of the following symptoms?  Fever/chills, Cough, Shortness of breath   Nursing Interventions  Nurse provided patient education   Pulse Ox monitoring  Intermittent   Pulse Ox device source  Patient   O2 Sat comments  Pt. states runs in 90s   O2 Sat: education provided  Sat levels, When to seek care, Monitoring frequency   O2 Sat education comments  Told pt. if unable to maintain O2 level  92% or greater to return to ED.   Is the patient/caregiver able to teach back steps to recovery at home?  Eat a well-balance diet, Rest and rebuild strength, gradually increase activity   If the patient is a current smoker, are they able to teach back resources for cessation?  Not a smoker   Is the patient/caregiver able to teach back the hierarchy of who to call/visit for symptoms/problems? PCP, Specialist, Home health nurse, Urgent Care, ED, 911  Yes   COVID-19 call completed?  Yes   Wrap up additional comments  Pt.states he felt feverish this am took Tylenol. Pt. states feeling better like temperature back down. Encouraged fluids, any increased SOB/difficulty breathing or worsening symptoms told to return to ED.           Verónica Alvarez RN

## 2021-02-11 NOTE — OUTREACH NOTE
COVID-19 Week 1 Survey      Responses   [facility] patient discharged from?  [place]   Does the patient have one of the following disease processes/diagnoses(primary or secondary)?  COVID-19   COVID-19 underlying condition?  None   Call Number  Call 1   Week 1 Call successful?  No   Discharge diagnosis  Pneumonia due to COVID-19 virus          Verónica Alvarez RN

## 2021-02-12 ENCOUNTER — READMISSION MANAGEMENT (OUTPATIENT)
Dept: CALL CENTER | Facility: HOSPITAL | Age: 71
End: 2021-02-12

## 2021-02-12 NOTE — OUTREACH NOTE
COVID-19 Week 1 Survey      Responses   [facility] [place] patient discharged from?  [place]   Does the patient have one of the following disease processes/diagnoses(primary or secondary)?  COVID-19   COVID-19 underlying condition?  None   Call Number  Call 2   Week 1 Call successful?  No   Discharge diagnosis  Pneumonia due to COVID-19 virus          Verónica Alvarez RN

## 2021-02-13 ENCOUNTER — READMISSION MANAGEMENT (OUTPATIENT)
Dept: CALL CENTER | Facility: HOSPITAL | Age: 71
End: 2021-02-13

## 2021-02-13 LAB
BACTERIA SPEC AEROBE CULT: NORMAL
BACTERIA SPEC AEROBE CULT: NORMAL

## 2021-02-13 NOTE — OUTREACH NOTE
COVID-19 Week 1 Survey      Responses   [facility] patient discharged from?  [place]   Does the patient have one of the following disease processes/diagnoses(primary or secondary)?  COVID-19   COVID-19 underlying condition?  None   Call Number  Call 3   Week 1 Call successful?  No   Discharge diagnosis  Pneumonia due to COVID-19 virus          Margie Eugene RN

## 2021-02-16 ENCOUNTER — READMISSION MANAGEMENT (OUTPATIENT)
Dept: CALL CENTER | Facility: HOSPITAL | Age: 71
End: 2021-02-16

## 2021-02-16 NOTE — OUTREACH NOTE
COVID-19 Week 1 Survey      Responses   [facility] patient discharged from?  [place]   Does the patient have one of the following disease processes/diagnoses(primary or secondary)?  COVID-19   COVID-19 underlying condition?  None   Call Number  Call 4   Week 1 Call successful?  No   Discharge diagnosis  Pneumonia due to COVID-19 virus          Lorena Fernando RN

## 2021-02-19 ENCOUNTER — READMISSION MANAGEMENT (OUTPATIENT)
Dept: CALL CENTER | Facility: HOSPITAL | Age: 71
End: 2021-02-19

## 2021-02-19 NOTE — OUTREACH NOTE
COVID-19 Week 2 Survey      Responses   [facility] patient discharged from?  [place]   Does the patient have one of the following disease processes/diagnoses(primary or secondary)?  COVID-19   COVID-19 underlying condition?  None   Call Number  Call 1   COVID-19 Week 2: Call 1 attempt successful?  Yes   Call start time  1214   Call end time  1220   Discharge diagnosis  Pneumonia due to COVID-19 virus   Meds reviewed with patient/caregiver?  Yes   Is the patient having any side effects they believe may be caused by any medication additions or changes?  No   Does the patient have all medications ordered at discharge?  Yes   Is the patient taking all medications as directed (includes completed medication regime)?  Yes   Does the patient have a primary care provider?   Yes   Does the patient have an appointment with their PCP or specialist within 7 days of discharge?  Yes   Has the patient kept scheduled appointments due by today?  Yes   Psychosocial issues?  No   What is the patient's perception of their health status since discharge?  Improving   Does the patient have any of the following symptoms?  Cough   Nursing Interventions  Nurse provided patient education   Pulse Ox monitoring  Intermittent   Pulse Ox device source  Patient   O2 Sat comments  98% RA   O2 Sat: education provided  Sat levels, Monitoring frequency, When to seek care   O2 Sat education comments  sat remains below 90 return to ED/or call 911   Is the patient/caregiver able to teach back steps to recovery at home?  Set small, achievable goals for return to baseline health, Rest and rebuild strength, gradually increase activity, Eat a well-balance diet   If the patient is a current smoker, are they able to teach back resources for cessation?  Not a smoker   Is the patient/caregiver able to teach back the hierarchy of who to call/visit for symptoms/problems? PCP, Specialist, Home health nurse, Urgent Care, ED, 911  Yes   COVID-19 call  completed?  Yes   Wrap up additional comments  Pt reports no fever, slight cough, no SOB, and states he is feeling good.          Latasha Venegas RN

## 2021-02-24 ENCOUNTER — READMISSION MANAGEMENT (OUTPATIENT)
Dept: CALL CENTER | Facility: HOSPITAL | Age: 71
End: 2021-02-24

## 2021-02-24 NOTE — OUTREACH NOTE
COVID-19 Week 3 Survey      Responses   [facility] patient discharged from?  [place]   Does the patient have one of the following disease processes/diagnoses(primary or secondary)?  COVID-19   COVID-19 underlying condition?  None   Call Number  Call 1   COVID-19 Week 3: Call 1 attempt successful?  No   Discharge diagnosis  Pneumonia due to COVID-19 virus          Margie Eugene RN

## 2021-03-12 ENCOUNTER — HOSPITAL ENCOUNTER (OUTPATIENT)
Dept: VACCINE CLINIC | Facility: HOSPITAL | Age: 71
Discharge: HOME | End: 2021-03-12
Attending: INTERNAL MEDICINE

## 2021-05-18 NOTE — PROGRESS NOTES
Martir Porter 1950     Office/Outpatient Visit    Visit Date: Sat, Jan 26, 2019 10:24 am    Provider: Tere Verdugo N.P. (Assistant: Kailyn Mendoza MA)    Location: [facility]        Electronically signed by Tere Verdugo N.P. on  01/26/2019 12:20:41 PM                             SUBJECTIVE:        CC:     Mr. Porter is a 68 year old White male.  presents today due to diarrhea x 1 week, has been taking old script of lomotil pt states he does not take flomax anymore         HPI: Kervin presents with c/o one week history of diarrhea. Describes as watery. No blood that he has noticed. Denies urinary symptoms. Some abdominal cramping with diarrhea episodes. He has had some right sided low back pain as well. Did lift something heavy earlier in the week. History of L4 ruptured disc from 2006. Took Pepto Bismol earlier in the week as well as Immodium. Also took some Lomotil. This seems to have helped. Last episode of diarrhea was midnight. Feels slightly better today. Colonoscopy 10/2016 with diverticulosis.         ROS:     CONSTITUTIONAL:  Negative for chills and fever.      CARDIOVASCULAR:  Negative for chest pain and palpitations.      RESPIRATORY:  Negative for dyspnea and frequent wheezing.      GASTROINTESTINAL:  Positive for diarrhea and abdominal cramping.   Negative for vomiting.      GENITOURINARY:  Negative for dysuria and polyuria.      MUSCULOSKELETAL:  Positive for back pain.          PMH/FM/SH:     Last Reviewed on 5/22/2018 10:44 AM by Yanira Murry    Past Medical History:             PREVENTIVE HEALTH MAINTENANCE             COLONOSCOPY: Done within the last 10 years was last done 10/21/2016 - diverticulosis     ENDOSCOPY: was last done 10/2016 with bx Dr. Ware         PAST MEDICAL HISTORY         Positive for    Hypertension and    Mild Cardiomegaly;         CURRENT MEDICAL PROVIDERS:    Cardiologist: Dr.Shugoll Baker Urologist scheduled for december 2016     Dr. Sarwat Iniguez         PAST MEDICAL HISTORY         disc disease         Surgical History:         Cholecystectomy: laparoscopic; 2015; with ERCP by  at [facility];         Family History:     Father: ;  aneurysm-brain;  ulcer of stomach     Mother: ;  Lung Cancer;  Cerebrovascular Accident     Brother(s): 1 brother(s) total     Sister(s): 1 sister(s) total;  Ovarian Cancer (  at age 22 )         Social History:     Occupation: [employer] ,  at [employer];     Marital Status:      Children: 3 children         Tobacco/Alcohol/Supplements:     Last Reviewed on 2018 01:45 PM by Debra Torrez    Tobacco: He has never smoked.  Non-drinker     Caffeine:  He admits to consuming caffeine via coffee ( 2 servings per day ).          Substance Abuse History:     Last Reviewed on 2016 10:12 AM by Yanira Murry        Mental Health History:     Last Reviewed on 2016 10:12 AM by Yanira Murry        Communicable Diseases (eg STDs):     Last Reviewed on 2016 10:12 AM by Yanira Murry            Current Problems:     Last Reviewed on 2018 10:44 AM by Yanira Murry    Diverticulosis     Low back pain     Actinic keratosis     BPH     Essential hypertension, benign     Abdominal pain, other specified site     Melena         Immunizations:     Fluzone High-Dose pf (>=65 yr) 2018         Allergies:     Last Reviewed on 2018 01:45 PM by Debra Torrez      No Known Drug Allergies.         Current Medications:     Last Reviewed on 2018 10:44 AM by Yanira Murry    Atenolol 50mg Tablet 1 tab daily     Omeprazole 40mg Capsules, Extended Release 1 capsule daily     Tamsulosin HCl 0.4mg Capsules 1 tab daily  #30 (Thirty) capsule(s)     Baclofen 10mg Tablet 1 tab po TID prn for pain/muscle pain     Prednisone 5mg Tablet Take 8 tablet(s) by mouth on day one, then taper by one pill daily     Tramadol 50mg Tablet 1 every 4  hours prn         OBJECTIVE:        Vitals:         Current: 1/26/2019 10:32:07 AM    Ht:  6 ft, 0 in;  Wt: 233.6 lbs;  BMI: 31.7    T: 97.9 F (oral);  BP: 154/90 mm Hg (left arm, sitting);  P: 66 bpm (left arm (BP Cuff), sitting);  sCr: 1.1 mg/dL;  GFR: 69.36        Exams:     PHYSICAL EXAM:     GENERAL: well developed, well nourished;  no apparent distress;     RESPIRATORY: normal respiratory rate and pattern with no distress; normal breath sounds with no rales, rhonchi, wheezes or rubs;     CARDIOVASCULAR: normal rate; rhythm is regular;     GASTROINTESTINAL: nontender; normal bowel sounds; no organomegaly;     MUSCULOSKELETAL: normal gait; normal range of motion of all major muscle groups; pain with range of motion in: the back;     NEUROLOGIC: mental status: alert and oriented x 3; GROSSLY INTACT     PSYCHIATRIC: appropriate affect and demeanor;         ASSESSMENT           787.91   R19.7  Diarrhea              DDx:         ORDERS:         Meds Prescribed:       Diphenoxylate/Atropine (Diphenoxylate/Atropine)  2.5mg/0.025mg Tablet 1-2 tabs PO BID prn diarrhea  #20 (Twenty) tablet(s) Refills: 0         Lab Orders:       [id number]  BDCB2 - [facility] CBC w/o diff  (Send-Out)         37321  COMP - H Comp. Metabolic Panel  (Send-Out)         03704  CDTEX - [facility] Clostridium Difficile Toxins A & B; dna probe  (Send-Out)         93913  STLC - [facility] Stool Culture  (Send-Out)         44792  OP - H Ova and parasite smear, stool  (Send-Out)         42250  LACFE - [facility] White Blood Cells (WBC), stool  (Send-Out)         78309  BDUAM - [facility] Urinalysis, automated, with micro  (Send-Out)                   PLAN:          Diarrhea CBC normal. VS stable.     LABORATORY:  Labs ordered to be performed today include CBC W/O DIFF, Comprehensive metabolic panel, Diarrhea testing C diff Stool culture Stool Ova and Parasites WBC Stool, and urinalysis with micro.      RECOMMENDATIONS given include: Further recommendation to be given after test  results are complete.      FOLLOW-UP: Schedule follow-up appointments on a p.r.n. basis. Chronic visit follow up     RECOMMENDATIONS given include: clear liquid diet for now, and advance as tolerated, begin oral fluid replacement with a glucose and electrolyte containing solution, avoidance of dairy products until better, and Rest.            Prescriptions:       Diphenoxylate/Atropine (Diphenoxylate/Atropine)  2.5mg/0.025mg Tablet 1-2 tabs PO BID prn diarrhea  #20 (Twenty) tablet(s) Refills: 0           Orders:       [id number]  BDCB2 - [facility] CBC w/o diff  (Send-Out)         02652  COMP - [facility] Comp. Metabolic Panel  (Send-Out)         15929  CDTEX - [facility] Clostridium Difficile Toxins A & B; dna probe  (Send-Out)         30350  STLC - [facility] Stool Culture  (Send-Out)         46083  OP - [facility] Ova and parasite smear, stool  (Send-Out)         54811  LACFE - [facility] White Blood Cells (WBC), stool  (Send-Out)         88262  BDUAM - [facility] Urinalysis, automated, with micro  (Send-Out)               Patient Recommendations:        For  Diarrhea:     You should replace fluid losses by drinking frequent, small amounts of a glucose and electrolyte containing solution. There are several commercially available products. Avoid consuming dairy products such as milk or cheese until your diarrhea has fully resolved.  Schedule follow-up appointments as needed.              CHARGE CAPTURE           **Please note: ICD descriptions below are intended for billing purposes only and may not represent clinical diagnoses**        Primary Diagnosis:         787.91 Diarrhea            R19.7    Diarrhea, unspecified              Orders:          51376   Office/outpatient visit; established patient, level 3  (In-House)

## 2021-05-18 NOTE — PROGRESS NOTES
Martir Porter  1950     Office/Outpatient Visit    Visit Date: Fri, Feb 5, 2021 04:28 pm    Provider: Tere Verdugo N.P. (Assistant: Aliya Person, )    Location: [facility]        Electronically signed by Tere Verdugo N.P. on  02/05/2021 06:14:54 PM                             Subjective:        CC: Kervin is a 70 year old White male.  fever, headache, fatigue, no appetite, has felt bad for two weeks, cough, clear sputum, received first COVID vaccine 2/2/21, son tested positive yesterday (PHONE [phone])         HPI:           Patient complains of acute upper respiratory infection, unspecified.  These have been present for the past 2 weeks.  The symptoms include chest congestion, cough, fever, headache, poor appetite and fatigue.  His son that lives with him tested positive for covid yesterday. He had his first covid vaccine on 2/2/21. Currently taking Levaquin for prostate infection. His daughter brought him over some medications to take - probiotic, cold and flu, immune support. Felt better for short time but symptoms returned over past few days.    ROS:     CONSTITUTIONAL:  Positive for fatigue and fever.      EYES:  Negative for blurred vision and eye drainage.      E/N/T:  Negative for ear pain and sore throat.      CARDIOVASCULAR:  Negative for chest pain and palpitations.      RESPIRATORY:  Positive for recent cough.   Negative for dyspnea or frequent wheezing.      NEUROLOGICAL:  Positive for headaches.   Negative for fainting.          Past Medical History / Family History / Social History:         Last Reviewed on 5/22/2018 10:44 AM by Yanira Murry    Past Medical History:             PREVENTIVE HEALTH MAINTENANCE             COLONOSCOPY: Done within the last 10 years was last done 10/21/2016 - diverticulosis     ENDOSCOPY: was last done 10/2016 with bx Dr. Ware         PAST MEDICAL HISTORY         Positive for    Hypertension and    Mild Cardiomegaly;          CURRENT MEDICAL PROVIDERS:    Cardiologist: Dr.Shugoll Baker Urologist scheduled for 2016    Dr. Sarwat Iniguez         PAST MEDICAL HISTORY         disc disease         Surgical History:         Cholecystectomy: laparoscopic; 2015; with ERCP by  at [facility];         Family History:     Father: ;  aneurysm-brain;  ulcer of stomach     Mother: ;  Lung Cancer;  Cerebrovascular Accident     Brother(s): 1 brother(s) total     Sister(s): 1 sister(s) total;  Ovarian Cancer (  at age 22 )         Social History:     Occupation: [employer] ,  at [employer];     Marital Status:      Children: 3 children         Tobacco/Alcohol/Supplements:     Last Reviewed on 2019 10:28 AM by Kailyn Mendoza    Tobacco: He has never smoked.  Non-drinker     Caffeine:  He admits to consuming caffeine via coffee ( 2 servings per day ).          Substance Abuse History:     Last Reviewed on 2016 10:12 AM by Yanira Murry        Mental Health History:     Last Reviewed on 2016 10:12 AM by Yanira Murry        Communicable Diseases (eg STDs):     Last Reviewed on 2016 10:12 AM by Yanira Murry        Current Problems:     Last Reviewed on 2018 10:44 AM by Yanira Murry    Essential hypertension, benign    Actinic keratosis    Actinic keratosis    Benign prostatic hyperplasia without lower urinary tract symptoms    Essential (primary) hypertension    BPH    Low back pain    Diverticulosis    Melena    Abdominal pain, other specified site    Unspecified abdominal pain    Melena        Immunizations:     Fluzone High-Dose pf (>=65 yr) 2018        Allergies:     Last Reviewed on 2019 10:28 AM by Kailyn Mendoza    No Known Allergies.        Current Medications:     Last Reviewed on 2019 10:28 AM by Kailyn Mendoza    Tramadol 50 mg oral tablet [1 every 4 hours prn]    Atenolol 50mg Tablet [1 tab daily ]     Omeprazole 20 mg oral capsule,delayed release (enteric coated) [1 capsule daily]    albuterol sulfate 90 mcg/actuation Inhalation HFA Aerosol Inhaler [inhale 1 - 2 puffs (90 - 180 mcg) by inhalation route every 4 hours as needed]        Objective:        Exams:     PHYSICAL EXAM:     RESPIRATORY: no sounds of respiratory distress over telephone;     NEUROLOGIC: mental status: alert and oriented x 3;     PSYCHIATRIC: appropriate affect and demeanor; normal speech pattern;         Assessment:         J06.9   Acute upper respiratory infection, unspecified           Plan:         Acute upper respiratory infection, unspecifiedDiscussed that symptoms could be related to recent covid vaccination or could be covid itself with exposure with his son testing positive that lives in same household. He declines to be scheduled for testing today. Notes that he will act as if he is positive. He is currently on Levaquin for prostate infection which should cover any potential PNA as well. Albuterol inhaler was sent in earlier today for him to use. He will also start immune support, cold and flu medication, and probiotic that his daughter brought over. He is aware that telehealth visits are limited and things can be missed without in person visit. F/U office for persistent symptoms. Strong ER precautions also given. Advised deep breathing exercises, up to walk around every couple of hours, sleep prone position or sitting up.        RECOMMENDATIONS given include: Push Fluids, Rest, Follow up if no improvement or worsening symptoms like high fevers, vomiting, weakness, or increasing shortness of air.    .  Telehealth: Verbal consent obtained for visit to occur via phone call; Staff, other than provider, present during telephone visit include Aliya Person; Total time spent was 14 minutes; 12150--Suffonsgi E/M 11-20 minutes     FOLLOW-UP: Chronic visit follow up             Charge Capture:         Primary Diagnosis:     J06.9  Acute  upper respiratory infection, unspecified           Orders:      31295  Phys/QHP telephone evaluation 11-20 minutes  (In-House)

## 2021-05-18 NOTE — PROGRESS NOTES
Martir Porter 1950     Office/Outpatient Visit    Visit Date: Mon, May 21, 2018 01:36 pm    Provider: Yanira Murry N.P. (Assistant: Debra Torrez MA)    Location: [facility]        Electronically signed by Yanira Murry N.P. on  05/22/2018 10:45:19 AM                             SUBJECTIVE:        CC:     Mr. Porter is a 67 year old White male.  lower stomach irratation right lower back irritation         HPI:     Went ot bed Thrusday night  - woke up at 12 hurting on right flank area  Tramadol helped  Friday better - but then came back no position makes better Bowels have not been normal  taking miralax  feeling nauseated - alkaseltzer / tums to relieve the bloating and   gas which  provides some relief  Stool is black  over the past few days -feels that this may be realted to the grape juice that he has been taking to help with his bowels         In regard to the abdominal pain, other specified site, associated symptoms include melena, change in bowel habits and fever.  He denies constipation, dysuria, hematuria or red bloody stools.  He reports that he is going more frequently, it is not liquid/ thin - just going more frequently but only in small amounts - he had gone a day or so with no BM Takes Ibuprofen every morning sometimes takes 2-4 per day have for years     ROS:     CONSTITUTIONAL:  Positive for fever ( low grade ).   Negative for chills or fatigue.      CARDIOVASCULAR:  Negative for chest pain, orthopnea, paroxysmal nocturnal dyspnea and pedal edema.      RESPIRATORY:  Negative for dyspnea and cough.      GASTROINTESTINAL:  Positive for abdominal pain ( right flank area ) and abdominal bloating.      GENITOURINARY:  Positive for change in bowel habits - feels like he is going less frequently than normal.   Negative for dysuria or hematuria.      MUSCULOSKELETAL:  Positive for back pain.          PM/FM/SH:     Last Reviewed on 5/22/2018 10:44 AM by Yanira Murry     Past Medical History:             PREVENTIVE HEALTH MAINTENANCE             COLONOSCOPY: Done within the last 10 years was last done 10/21/2016 - diverticulosis     ENDOSCOPY: was last done 10/2016 with bx Dr. Ware         PAST MEDICAL HISTORY         Positive for    Hypertension and    Mild Cardiomegaly;         CURRENT MEDICAL PROVIDERS:    Cardiologist: Dr.Shugoll aBker Urologist scheduled for 2016    Dr. Sarwat Iniguez         PAST MEDICAL HISTORY         disc disease         Surgical History:         Cholecystectomy: laparoscopic; ; with ERCP by  at [facility];         Family History:     Father: ;  aneurysm-brain;  ulcer of stomach     Mother: ;  Lung Cancer;  Cerebrovascular Accident     Brother(s): 1 brother(s) total     Sister(s): 1 sister(s) total;  Ovarian Cancer (  at age 22 )         Social History:     Occupation: [employer] ,  at [employer];     Marital Status:      Children: 3 children         Tobacco/Alcohol/Supplements:     Last Reviewed on 2018 01:45 PM by Debra Torrez    Tobacco: He has never smoked.  Non-drinker     Caffeine:  He admits to consuming caffeine via coffee ( 2 servings per day ).          Substance Abuse History:     Last Reviewed on 2016 10:12 AM by Yanira Murry        Mental Health History:     Last Reviewed on 2016 10:12 AM by Yanira Murry        Communicable Diseases (eg STDs):     Last Reviewed on 2016 10:12 AM by Yanira Murry            Current Problems:     Last Reviewed on 2018 10:44 AM by Yanira Murry    Lower back pain     Diverticulosis     Low back pain     Actinic keratosis     BPH     Acute pancreatitis     Essential hypertension, benign     Diarrhea     Bilirubinuria         Immunizations:     Fluzone High-Dose pf (>=65 yr) 2018         Allergies:     Last Reviewed on 2018 01:45 PM by Debra Torrez      No Known Drug Allergies.          Current Medications:     Last Reviewed on 5/22/2018 10:44 AM by Yanira Murry    Atenolol 50mg Tablet 1 tab daily     Omeprazole 40mg Capsules, Extended Release 1 capsule daily     Tamsulosin HCl 0.4mg Capsules 1 tab daily  #30 (Thirty) capsule(s)     Tramadol 50mg Tablet 1 every 4 hours prn     Lactulose 10gm/15ml Syrup 1-3 teaspoonfuls daily prn constipation         OBJECTIVE:        Vitals:         Historical:     09/26/2016  Wt:   226.1lbs        Current: 5/21/2018 1:41:36 PM    Ht:  6 ft, 0 in;  Wt: 237 lbs;  BMI: 32.1    T: 99.2 F (oral);  BP: 145/76 mm Hg (left arm, sitting);  P: 86 bpm (left arm (BP Cuff), sitting);  sCr: 1.1 mg/dL;  GFR: 70.75        Exams:     PHYSICAL EXAM:     GENERAL: Vitals recorded well developed, well nourished;  well groomed;  no apparent distress; low grade fever     RESPIRATORY: normal respiratory rate and pattern with no distress; normal breath sounds with no rales, rhonchi, wheezes or rubs;     CARDIOVASCULAR: normal rate; rhythm is regular;  normal S1; normal S2; no systolic murmur; no cyanosis; no edema;     GASTROINTESTINAL: nontender, nondistended; no hepatosplenomegaly or masses; no bruits; nontender;     SKIN:  no significant rashes or lesions; no suspicious moles;     MUSCULOSKELETAL: right flank tenderness;     NEUROLOGICAL:  cranial nerves, motor and sensory function, reflexes, gait and coordination are all intact;     PSYCHIATRIC:  appropriate affect and demeanor; normal speech pattern; grossly normal memory;         Lab/Test Results:             Glucose, Urine:  Neg (05/21/2018),     Bilirubin, urine:  Negative (05/21/2018),     Ketones, Urine Strip:  Negative (05/21/2018),     Specific Gravity, urine:  1.020 (05/21/2018),     Blood in Urine:  trace (05/21/2018),     pH, urine:  7.0 (05/21/2018),     Protein Urine QL:  negative (05/21/2018),     Urobilinogen, urine:  4.0 E.U./dL (05/21/2018),     Nitrite, Urine:  Negative (05/21/2018),     Leukoctyes,  urine:  Negative (05/21/2018),     Appearance:  Slightly Cloudy (05/21/2018),     collection source:  Clean-catch (05/21/2018),     Color:  Dark Yellow (05/21/2018),     Performed by::  matt (05/21/2018),             ASSESSMENT:           724.2   M54.5  Lower back pain              DDx:     787.91   R19.7  Diarrhea              DDx:     791.4   R82.2  Bilirubinuria              DDx:         ORDERS:         Radiology/Test Orders:       [id number]  Radiologic examination, abdomen; single anteroposterior view  (Send-Out)           Lab Orders:       [id number]  Urinalysis, automated, without microscopy  (In-House)         66735  BDCB2 - [facility] CBC w/o diff  (Send-Out)         11013  COMP - [facility] Comp. Metabolic Panel  (Send-Out)         18768  HPUBT - [facility] H.pylori Breath test  (Send-Out)         36650  AHEP4 - [facility] Hepatitis Panel (HAAb, HbcAB, HbsAG, Hep C antibody)  (Send-Out)                   PLAN:          Lower back pain          Orders:       [id number]  Urinalysis, automated, without microscopy  (In-House)            Diarrhea     LABORATORY:  Labs ordered to be performed today include CBC W/O DIFF, Comprehensive metabolic panel, and H.pylori urea breath test ([facility]).      RADIOLOGY:  I have ordered a KUB to be done today.            Orders:       84677  BDCB2 - [facility] CBC w/o diff  (Send-Out)         62090  COMP - [facility] Comp. Metabolic Panel  (Send-Out)         96412  HPUBT - [facility] H.pylori Breath test  (Send-Out)         65077  Radiologic examination, abdomen; single anteroposterior view  (Send-Out)            Bilirubinuria     LABORATORY:  Labs ordered to be performed today include hepatitis panel.            Orders:       74980  AHEP4 - [facility] Hepatitis Panel (HAAb, HbcAB, HbsAG, Hep C antibody)  (Send-Out)               CHARGE CAPTURE:           Primary Diagnosis:     724.2 Lower back pain            M54.5    Low back pain              Orders:          68666   Office/outpatient visit; established patient, level 3  (In-House)              04596   Urinalysis, automated, without microscopy  (In-House)           787.91 Diarrhea            R19.7    Diarrhea, unspecified    791.4 Bilirubinuria            R82.2    Biliuria

## 2021-07-01 VITALS
SYSTOLIC BLOOD PRESSURE: 145 MMHG | TEMPERATURE: 99.2 F | HEIGHT: 72 IN | BODY MASS INDEX: 32.1 KG/M2 | DIASTOLIC BLOOD PRESSURE: 76 MMHG | WEIGHT: 237 LBS | HEART RATE: 86 BPM

## 2021-07-01 VITALS
DIASTOLIC BLOOD PRESSURE: 90 MMHG | SYSTOLIC BLOOD PRESSURE: 154 MMHG | HEART RATE: 66 BPM | HEIGHT: 72 IN | BODY MASS INDEX: 31.64 KG/M2 | WEIGHT: 233.6 LBS | TEMPERATURE: 97.9 F

## 2021-10-11 ENCOUNTER — DOCUMENTATION (OUTPATIENT)
Dept: FAMILY MEDICINE CLINIC | Age: 71
End: 2021-10-11

## 2021-10-11 RX ORDER — AMOXICILLIN AND CLAVULANATE POTASSIUM 875; 125 MG/1; MG/1
1 TABLET, FILM COATED ORAL 2 TIMES DAILY
Qty: 20 TABLET | Refills: 0 | Status: SHIPPED | OUTPATIENT
Start: 2021-10-11 | End: 2021-10-21

## 2022-03-25 ENCOUNTER — OFFICE VISIT (OUTPATIENT)
Dept: FAMILY MEDICINE CLINIC | Age: 72
End: 2022-03-25

## 2022-03-25 VITALS
OXYGEN SATURATION: 95 % | DIASTOLIC BLOOD PRESSURE: 66 MMHG | BODY MASS INDEX: 32.91 KG/M2 | HEART RATE: 65 BPM | WEIGHT: 243 LBS | SYSTOLIC BLOOD PRESSURE: 127 MMHG | HEIGHT: 72 IN | TEMPERATURE: 99.1 F

## 2022-03-25 DIAGNOSIS — J44.1 COPD WITH EXACERBATION: Primary | ICD-10-CM

## 2022-03-25 PROBLEM — R05.9 COUGH: Status: ACTIVE | Noted: 2022-03-25

## 2022-03-25 LAB
EXPIRATION DATE: NORMAL
INTERNAL CONTROL: NORMAL
Lab: NORMAL
SARS-COV-2 AG UPPER RESP QL IA.RAPID: NOT DETECTED

## 2022-03-25 PROCEDURE — 99213 OFFICE O/P EST LOW 20 MIN: CPT | Performed by: NURSE PRACTITIONER

## 2022-03-25 PROCEDURE — 87426 SARSCOV CORONAVIRUS AG IA: CPT | Performed by: NURSE PRACTITIONER

## 2022-03-25 RX ORDER — DOXYCYCLINE HYCLATE 100 MG
100 TABLET ORAL 2 TIMES DAILY
Qty: 20 TABLET | Refills: 0 | Status: SHIPPED | OUTPATIENT
Start: 2022-03-25 | End: 2022-04-04

## 2022-03-25 RX ORDER — TAMSULOSIN HYDROCHLORIDE 0.4 MG/1
1 CAPSULE ORAL DAILY
COMMUNITY
End: 2023-04-04 | Stop reason: ALTCHOICE

## 2022-03-25 RX ORDER — OMEPRAZOLE 20 MG/1
20 CAPSULE, DELAYED RELEASE ORAL DAILY
COMMUNITY
Start: 2022-01-13

## 2022-03-25 RX ORDER — ALBUTEROL SULFATE 2.5 MG/3ML
2.5 SOLUTION RESPIRATORY (INHALATION) EVERY 4 HOURS PRN
Qty: 50 EACH | Refills: 0 | Status: ON HOLD | OUTPATIENT
Start: 2022-03-25 | End: 2022-08-11

## 2022-03-25 RX ORDER — ALBUTEROL SULFATE 90 UG/1
2 AEROSOL, METERED RESPIRATORY (INHALATION) EVERY 4 HOURS PRN
Qty: 18 G | Refills: 0 | Status: ON HOLD | OUTPATIENT
Start: 2022-03-25 | End: 2022-08-11

## 2022-03-25 RX ORDER — LORATADINE 10 MG/1
10 TABLET ORAL DAILY
COMMUNITY
End: 2023-04-04

## 2022-03-25 RX ORDER — LIDOCAINE 5 G/100G
1 CREAM RECTAL; TOPICAL AS NEEDED
COMMUNITY
End: 2023-04-04

## 2022-03-25 RX ORDER — PREDNISONE 10 MG/1
TABLET ORAL
Qty: 35 TABLET | Refills: 0 | Status: SHIPPED | OUTPATIENT
Start: 2022-03-25 | End: 2022-04-09

## 2022-03-25 NOTE — PROGRESS NOTES
Chief Complaint  Martir Porter presents to [facility] for URI (Cough, congestion, X2 days )    Subjective          History of Present Illness    Mr Porter is here with c/o 2 day history of symptoms including chest congestion, cough., some minimal SOA. Has taken mucinex for symptoms. No known ill contacts. UTD influenza and covid vaccine.     Review of Systems      Allergies   Allergen Reactions   • Levofloxacin Other (See Comments)      Past Medical History:   Diagnosis Date   • COPD (chronic obstructive pulmonary disease) (HCC)     mild   • GERD (gastroesophageal reflux disease)    • Hypertension    • Pneumonia      Current Outpatient Medications   Medication Sig Dispense Refill   • amLODIPine (NORVASC) 5 MG tablet Take 5 mg by mouth Daily.     • atenolol (TENORMIN) 50 MG tablet Take 50 mg by mouth Daily.     • Diclofenac Sodium (VOLTAREN) 1 % gel gel As Needed.     • Lidocaine, Anorectal, (LMX 5) 5 % cream cream 1 g.     • loratadine (CLARITIN) 10 MG tablet Claritin 10 mg tablet   Take 1 tablet every day by oral route.     • losartan (COZAAR) 50 MG tablet Take 50 mg by mouth Daily.     • omeprazole (priLOSEC) 20 MG capsule Take 20 mg by mouth Daily.     • tamsulosin (FLOMAX) 0.4 MG capsule 24 hr capsule Take 1 capsule by mouth Daily.     • traMADol (ULTRAM) 50 MG tablet Take 50 mg by mouth 2 (Two) Times a Day.     • albuterol (PROVENTIL) (2.5 MG/3ML) 0.083% nebulizer solution Take 2.5 mg by nebulization Every 4 (Four) Hours As Needed for Wheezing. 50 each 0   • albuterol sulfate  (90 Base) MCG/ACT inhaler Inhale 2 puffs Every 4 (Four) Hours As Needed for Shortness of Air. 18 g 0   • doxycycline (VIBRAMYICN) 100 MG tablet Take 1 tablet by mouth 2 (Two) Times a Day for 10 days. No dairy products within 2 hours of a dose 20 tablet 0   • predniSONE (DELTASONE) 10 MG tablet Take 2 tablets by mouth 2 (Two) Times a Day for 5 days, THEN 1 tablet 2 (Two) Times a Day for 5  "days, THEN 1 tablet Daily for 5 days. 35 tablet 0     No current facility-administered medications for this visit.     History reviewed. No pertinent surgical history.   Social History     Tobacco Use   • Smoking status: Never Smoker   • Smokeless tobacco: Never Used   Substance Use Topics   • Alcohol use: Never   • Drug use: Never     History reviewed. No pertinent family history.  Health Maintenance Due   Topic Date Due   • ZOSTER VACCINE (1 of 2) Never done   • TDAP/TD VACCINES (2 - Td or Tdap) 08/01/2017   • HEPATITIS C SCREENING  Never done   • ANNUAL WELLNESS VISIT  Never done      Immunization History   Administered Date(s) Administered   • COVID-19 (MODERNA) 1st, 2nd, 3rd Dose Only 02/02/2021, 03/12/2021, 01/11/2022   • FluLaval/Fluarix/Fluzone >6 10/20/2021   • Fluzone High Dose =>65 Years (Vaxcare ONLY) 01/27/2018, 10/21/2018   • Pneumococcal Conjugate 13-Valent (PCV13) 12/01/2015   • Pneumococcal Polysaccharide (PPSV23) 05/08/2017   • Td, Not Adsorbed 02/01/2005   • Tdap 08/01/2007   • flucelvax quad pfs =>4 YRS 11/13/2020        Objective     Vitals:    03/25/22 1017   BP: 127/66   BP Location: Left arm   Patient Position: Sitting   Pulse: 65   Temp: 99.1 °F (37.3 °C)   TempSrc: Oral   SpO2: 95%   Weight: 110 kg (243 lb)   Height: 182.9 cm (72\")     Body mass index is 32.96 kg/m².     Physical Exam  Vitals reviewed.   Constitutional:       General: He is not in acute distress.     Appearance: Normal appearance. He is well-developed.   HENT:      Head: Normocephalic and atraumatic.      Right Ear: Tympanic membrane and ear canal normal.      Left Ear: Tympanic membrane and ear canal normal.      Nose: Nose normal.      Mouth/Throat:      Mouth: Mucous membranes are moist.      Comments: PND  Eyes:      Extraocular Movements: Extraocular movements intact.      Pupils: Pupils are equal, round, and reactive to light.   Cardiovascular:      Rate and Rhythm: Normal rate and regular rhythm.   Pulmonary:      " Effort: Pulmonary effort is normal.      Breath sounds: Normal breath sounds.   Neurological:      Mental Status: He is alert and oriented to person, place, and time.   Psychiatric:         Mood and Affect: Mood and affect normal.           Result Review :     The following data was reviewed by: CHELSEY Quintero on 03/25/2022:          POCT SARS-CoV-2 Antigen AMY (03/25/2022 10:48)                  Assessment and Plan      Diagnoses and all orders for this visit:    1. COPD with exacerbation (HCC) (Primary)  Comments:  Rest, fluids. Symptomatic treatment. Mucinex per package instructions.   Orders:  -     POCT SARS-CoV-2 Antigen AMY  -     albuterol sulfate  (90 Base) MCG/ACT inhaler; Inhale 2 puffs Every 4 (Four) Hours As Needed for Shortness of Air.  Dispense: 18 g; Refill: 0  -     albuterol (PROVENTIL) (2.5 MG/3ML) 0.083% nebulizer solution; Take 2.5 mg by nebulization Every 4 (Four) Hours As Needed for Wheezing.  Dispense: 50 each; Refill: 0  -     predniSONE (DELTASONE) 10 MG tablet; Take 2 tablets by mouth 2 (Two) Times a Day for 5 days, THEN 1 tablet 2 (Two) Times a Day for 5 days, THEN 1 tablet Daily for 5 days.  Dispense: 35 tablet; Refill: 0  -     doxycycline (VIBRAMYICN) 100 MG tablet; Take 1 tablet by mouth 2 (Two) Times a Day for 10 days. No dairy products within 2 hours of a dose  Dispense: 20 tablet; Refill: 0              Follow Up     Return for As needed for persistent or worsening symptoms.

## 2022-08-01 ENCOUNTER — OFFICE VISIT (OUTPATIENT)
Dept: CARDIOLOGY | Facility: CLINIC | Age: 72
End: 2022-08-01

## 2022-08-01 ENCOUNTER — PREP FOR SURGERY (OUTPATIENT)
Dept: OTHER | Facility: HOSPITAL | Age: 72
End: 2022-08-01

## 2022-08-01 VITALS
HEART RATE: 58 BPM | HEIGHT: 72 IN | DIASTOLIC BLOOD PRESSURE: 72 MMHG | SYSTOLIC BLOOD PRESSURE: 142 MMHG | WEIGHT: 246 LBS | BODY MASS INDEX: 33.32 KG/M2

## 2022-08-01 DIAGNOSIS — I20.0 UNSTABLE ANGINA: Primary | ICD-10-CM

## 2022-08-01 DIAGNOSIS — I10 HYPERTENSION, ESSENTIAL: ICD-10-CM

## 2022-08-01 DIAGNOSIS — I20.9 ANGINA PECTORIS: Primary | ICD-10-CM

## 2022-08-01 DIAGNOSIS — E78.2 HYPERLIPEMIA, MIXED: ICD-10-CM

## 2022-08-01 PROCEDURE — 99204 OFFICE O/P NEW MOD 45 MIN: CPT | Performed by: INTERNAL MEDICINE

## 2022-08-01 RX ORDER — ASPIRIN 81 MG/1
81 TABLET ORAL DAILY
COMMUNITY
Start: 2022-07-20 | End: 2023-04-04 | Stop reason: ALTCHOICE

## 2022-08-01 RX ORDER — ASCORBIC ACID 500 MG
500 TABLET ORAL DAILY
COMMUNITY

## 2022-08-01 RX ORDER — MELATONIN
1000 DAILY
COMMUNITY
End: 2023-04-04 | Stop reason: SDDI

## 2022-08-01 RX ORDER — NITROGLYCERIN 0.4 MG/1
0.4 TABLET SUBLINGUAL
COMMUNITY

## 2022-08-01 NOTE — PROGRESS NOTES
Chief Complaint  NEW PATIENT (ABNORMAL STRESS PER GARCÍA COON)    Subjective            Martir Porter presents to [facility]  History of Present Illness    71-year-old white male.  He has no previous cardiac problems in the past.  Over the past 6 months he has had accelerating worsening episodes of intermittent chest discomfort, it is mild to moderate, somewhat of a heaviness or pressure.  It can sometimes wake him up from sleep.  It is daily for the past few days.  He had a treadmill stress test ordered recently, he exercised for 6 minutes and had 2 mm of ST depression.  However it did not reproduce the chest discomfort that he complained of.  His baseline EKG shows nonspecific ST changes.  His risk factors include age, hypertension, dyslipidemia.    PMH  Past Medical History:   Diagnosis Date   • Abnormal ECG    • GERD (gastroesophageal reflux disease)    • Pneumonia          SURGICALHX  History reviewed. No pertinent surgical history.     SOC  Social History     Socioeconomic History   • Marital status:    Tobacco Use   • Smoking status: Former Smoker   • Smokeless tobacco: Never Used   Vaping Use   • Vaping Use: Never used   Substance and Sexual Activity   • Alcohol use: Never   • Drug use: Never   • Sexual activity: Defer         FAMHX  Family History   Problem Relation Age of Onset   • Stroke Mother    • Aneurysm Father           ALLERGY  No Known Allergies     MEDSCURRENT    Current Outpatient Medications:   •  amLODIPine (NORVASC) 5 MG tablet, Take 5 mg by mouth Daily., Disp: , Rfl:   •  Ascorbic Acid (Vitamin C) 500 MG pack, Take  by mouth., Disp: , Rfl:   •  aspirin 81 MG EC tablet, Jose Miguel Low Dose Aspirin  1 DAILY, Disp: , Rfl:   •  atenolol (TENORMIN) 50 MG tablet, Take 50 mg by mouth Daily., Disp: , Rfl:   •  B Complex Vitamins (VITAMIN B COMPLEX PO), Take  by mouth., Disp: , Rfl:   •  cholecalciferol (VITAMIN D3) 25 MCG (1000 UT) tablet, Take 1,000 Units by  "mouth Daily., Disp: , Rfl:   •  Diclofenac Sodium (VOLTAREN) 1 % gel gel, As Needed., Disp: , Rfl:   •  Lidocaine, Anorectal, (LMX 5) 5 % cream cream, 1 g., Disp: , Rfl:   •  loratadine (CLARITIN) 10 MG tablet, Claritin 10 mg tablet  Take 1 tablet every day by oral route., Disp: , Rfl:   •  losartan (COZAAR) 50 MG tablet, Take 50 mg by mouth Daily., Disp: , Rfl:   •  nitroglycerin (NITROSTAT) 0.4 MG SL tablet, nitroglycerin 0.4 mg sublingual tablet  1 TABLET S.L. Q 5 MINUTES  WITH ONSET OF CHEST PAIN, Disp: , Rfl:   •  omeprazole (priLOSEC) 20 MG capsule, Take 20 mg by mouth Daily., Disp: , Rfl:   •  tamsulosin (FLOMAX) 0.4 MG capsule 24 hr capsule, Take 1 capsule by mouth Daily., Disp: , Rfl:   •  traMADol (ULTRAM) 50 MG tablet, Take 50 mg by mouth 2 (Two) Times a Day., Disp: , Rfl:   •  albuterol (PROVENTIL) (2.5 MG/3ML) 0.083% nebulizer solution, Take 2.5 mg by nebulization Every 4 (Four) Hours As Needed for Wheezing., Disp: 50 each, Rfl: 0  •  albuterol sulfate  (90 Base) MCG/ACT inhaler, Inhale 2 puffs Every 4 (Four) Hours As Needed for Shortness of Air., Disp: 18 g, Rfl: 0      Review of Systems   Constitutional: Positive for malaise/fatigue.   HENT: Negative.    Eyes: Negative.    Cardiovascular: Positive for chest pain and dyspnea on exertion.   Respiratory: Positive for shortness of breath.    Endocrine: Negative.    Hematologic/Lymphatic: Negative.    Skin: Negative.    Musculoskeletal: Negative.    Gastrointestinal: Negative.    Genitourinary: Negative.    Neurological: Negative.    Psychiatric/Behavioral: Negative.         Objective     /72   Pulse 58   Ht 182.9 cm (72\")   Wt 112 kg (246 lb)   BMI 33.36 kg/m²       General Appearance:   · well developed  · well nourished  HENT:   · oropharynx moist  · lips not cyanotic  Neck:  · thyroid not enlarged  · supple  Respiratory:  · no respiratory distress  · normal breath sounds  · no rales  Cardiovascular:  · no jugular venous " distention  · regular rhythm  · apical impulse normal  · S1 normal, S2 normal  · no S3, no S4   · no murmur  · no rub, no thrill  · carotid pulses normal; no bruit  · pedal pulses normal  · lower extremity edema: none    Musculoskeletal:  · no clubbing of fingers.   · normocephalic, head atraumatic  Skin:   · warm, dry  Psychiatric:  · judgement and insight appropriate  · normal mood and affect      Result Review :             Data reviewed: Primary care records reviewed, , EKG reviewed treadmill test reviewed     Procedures         Assessment and Plan        ASSESSMENT:  Encounter Diagnoses   Name Primary?   • Angina pectoris (HCC) Yes   • Hyperlipemia, mixed    • Hypertension, essential          PLAN:    1.  Mr. Porter has relatively typical anginal chest pain which is accelerating over the past several months.  In addition he recently had a treadmill EKG which showed 2 mm of horizontal ST depression at peak exertion.  Based on his history and risk factors he has a high probability of obstructive coronary artery disease.  I discussed with him today further work-up including stress imaging versus coronary angiography.  I think given the high prove probability of CAD coronary angiography is the best next test in his case.  I agree with his aspirin and statin therapy.  I discussed the risk and benefits with him including cardiac catheterization via radial or femoral artery, possible need for PCI or even CABG.  He is agreeable to proceed.  2.  Mixed hyperlipidemia, continue statin therapy  3.  Essential hypertension, continue current medical regimen    Follow-up to be determined after angiography      Patient was given instructions and counseling regarding his condition or for health maintenance advice. Please see specific information pulled into the AVS if appropriate.             MONY Francisco MD  8/1/2022    15:24 EDT

## 2022-08-01 NOTE — H&P (VIEW-ONLY)
Chief Complaint  NEW PATIENT (ABNORMAL STRESS PER GARCÍA COON)    Subjective            Martir Porter presents to [facility]  History of Present Illness    71-year-old white male.  He has no previous cardiac problems in the past.  Over the past 6 months he has had accelerating worsening episodes of intermittent chest discomfort, it is mild to moderate, somewhat of a heaviness or pressure.  It can sometimes wake him up from sleep.  It is daily for the past few days.  He had a treadmill stress test ordered recently, he exercised for 6 minutes and had 2 mm of ST depression.  However it did not reproduce the chest discomfort that he complained of.  His baseline EKG shows nonspecific ST changes.  His risk factors include age, hypertension, dyslipidemia.    PMH  Past Medical History:   Diagnosis Date   • Abnormal ECG    • GERD (gastroesophageal reflux disease)    • Pneumonia          SURGICALHX  History reviewed. No pertinent surgical history.     SOC  Social History     Socioeconomic History   • Marital status:    Tobacco Use   • Smoking status: Former Smoker   • Smokeless tobacco: Never Used   Vaping Use   • Vaping Use: Never used   Substance and Sexual Activity   • Alcohol use: Never   • Drug use: Never   • Sexual activity: Defer         FAMHX  Family History   Problem Relation Age of Onset   • Stroke Mother    • Aneurysm Father           ALLERGY  No Known Allergies     MEDSCURRENT    Current Outpatient Medications:   •  amLODIPine (NORVASC) 5 MG tablet, Take 5 mg by mouth Daily., Disp: , Rfl:   •  Ascorbic Acid (Vitamin C) 500 MG pack, Take  by mouth., Disp: , Rfl:   •  aspirin 81 MG EC tablet, Jose Miguel Low Dose Aspirin  1 DAILY, Disp: , Rfl:   •  atenolol (TENORMIN) 50 MG tablet, Take 50 mg by mouth Daily., Disp: , Rfl:   •  B Complex Vitamins (VITAMIN B COMPLEX PO), Take  by mouth., Disp: , Rfl:   •  cholecalciferol (VITAMIN D3) 25 MCG (1000 UT) tablet, Take 1,000 Units by  "mouth Daily., Disp: , Rfl:   •  Diclofenac Sodium (VOLTAREN) 1 % gel gel, As Needed., Disp: , Rfl:   •  Lidocaine, Anorectal, (LMX 5) 5 % cream cream, 1 g., Disp: , Rfl:   •  loratadine (CLARITIN) 10 MG tablet, Claritin 10 mg tablet  Take 1 tablet every day by oral route., Disp: , Rfl:   •  losartan (COZAAR) 50 MG tablet, Take 50 mg by mouth Daily., Disp: , Rfl:   •  nitroglycerin (NITROSTAT) 0.4 MG SL tablet, nitroglycerin 0.4 mg sublingual tablet  1 TABLET S.L. Q 5 MINUTES  WITH ONSET OF CHEST PAIN, Disp: , Rfl:   •  omeprazole (priLOSEC) 20 MG capsule, Take 20 mg by mouth Daily., Disp: , Rfl:   •  tamsulosin (FLOMAX) 0.4 MG capsule 24 hr capsule, Take 1 capsule by mouth Daily., Disp: , Rfl:   •  traMADol (ULTRAM) 50 MG tablet, Take 50 mg by mouth 2 (Two) Times a Day., Disp: , Rfl:   •  albuterol (PROVENTIL) (2.5 MG/3ML) 0.083% nebulizer solution, Take 2.5 mg by nebulization Every 4 (Four) Hours As Needed for Wheezing., Disp: 50 each, Rfl: 0  •  albuterol sulfate  (90 Base) MCG/ACT inhaler, Inhale 2 puffs Every 4 (Four) Hours As Needed for Shortness of Air., Disp: 18 g, Rfl: 0      Review of Systems   Constitutional: Positive for malaise/fatigue.   HENT: Negative.    Eyes: Negative.    Cardiovascular: Positive for chest pain and dyspnea on exertion.   Respiratory: Positive for shortness of breath.    Endocrine: Negative.    Hematologic/Lymphatic: Negative.    Skin: Negative.    Musculoskeletal: Negative.    Gastrointestinal: Negative.    Genitourinary: Negative.    Neurological: Negative.    Psychiatric/Behavioral: Negative.         Objective     /72   Pulse 58   Ht 182.9 cm (72\")   Wt 112 kg (246 lb)   BMI 33.36 kg/m²       General Appearance:   · well developed  · well nourished  HENT:   · oropharynx moist  · lips not cyanotic  Neck:  · thyroid not enlarged  · supple  Respiratory:  · no respiratory distress  · normal breath sounds  · no rales  Cardiovascular:  · no jugular venous " distention  · regular rhythm  · apical impulse normal  · S1 normal, S2 normal  · no S3, no S4   · no murmur  · no rub, no thrill  · carotid pulses normal; no bruit  · pedal pulses normal  · lower extremity edema: none    Musculoskeletal:  · no clubbing of fingers.   · normocephalic, head atraumatic  Skin:   · warm, dry  Psychiatric:  · judgement and insight appropriate  · normal mood and affect      Result Review :             Data reviewed: Primary care records reviewed, , EKG reviewed treadmill test reviewed     Procedures         Assessment and Plan        ASSESSMENT:  Encounter Diagnoses   Name Primary?   • Angina pectoris (HCC) Yes   • Hyperlipemia, mixed    • Hypertension, essential          PLAN:    1.  Mr. Porter has relatively typical anginal chest pain which is accelerating over the past several months.  In addition he recently had a treadmill EKG which showed 2 mm of horizontal ST depression at peak exertion.  Based on his history and risk factors he has a high probability of obstructive coronary artery disease.  I discussed with him today further work-up including stress imaging versus coronary angiography.  I think given the high prove probability of CAD coronary angiography is the best next test in his case.  I agree with his aspirin and statin therapy.  I discussed the risk and benefits with him including cardiac catheterization via radial or femoral artery, possible need for PCI or even CABG.  He is agreeable to proceed.  2.  Mixed hyperlipidemia, continue statin therapy  3.  Essential hypertension, continue current medical regimen    Follow-up to be determined after angiography      Patient was given instructions and counseling regarding his condition or for health maintenance advice. Please see specific information pulled into the AVS if appropriate.             MONY Francisco MD  8/1/2022    15:24 EDT

## 2022-08-11 ENCOUNTER — HOSPITAL ENCOUNTER (OUTPATIENT)
Facility: HOSPITAL | Age: 72
Setting detail: HOSPITAL OUTPATIENT SURGERY
Discharge: HOME | End: 2022-08-11
Attending: INTERNAL MEDICINE | Admitting: INTERNAL MEDICINE

## 2022-08-11 VITALS
RESPIRATION RATE: 16 BRPM | SYSTOLIC BLOOD PRESSURE: 140 MMHG | OXYGEN SATURATION: 99 % | TEMPERATURE: 98.1 F | HEART RATE: 59 BPM | DIASTOLIC BLOOD PRESSURE: 69 MMHG

## 2022-08-11 DIAGNOSIS — I20.0 UNSTABLE ANGINA: ICD-10-CM

## 2022-08-11 LAB
ANION GAP SERPL CALCULATED.3IONS-SCNC: 7.4 MMOL/L (ref 5–15)
BUN SERPL-MCNC: 18 MG/DL (ref 8–23)
BUN/CREAT SERPL: 14.4 (ref 7–25)
CALCIUM SPEC-SCNC: 9.3 MG/DL (ref 8.6–10.5)
CHLORIDE SERPL-SCNC: 107 MMOL/L (ref 98–107)
CO2 SERPL-SCNC: 28.6 MMOL/L (ref 22–29)
CREAT SERPL-MCNC: 1.25 MG/DL (ref 0.76–1.27)
DEPRECATED RDW RBC AUTO: 41.6 FL (ref 37–54)
EGFRCR SERPLBLD CKD-EPI 2021: 60.8 ML/MIN/1.73
ERYTHROCYTE [DISTWIDTH] IN BLOOD BY AUTOMATED COUNT: 13.2 % (ref 12.3–15.4)
GLUCOSE SERPL-MCNC: 113 MG/DL (ref 65–99)
HCT VFR BLD AUTO: 38 % (ref 37.5–51)
HGB BLD-MCNC: 12.7 G/DL (ref 13–17.7)
INR PPP: 1 (ref 0.86–1.15)
MCH RBC QN AUTO: 29 PG (ref 26.6–33)
MCHC RBC AUTO-ENTMCNC: 33.4 G/DL (ref 31.5–35.7)
MCV RBC AUTO: 86.8 FL (ref 79–97)
PLATELET # BLD AUTO: 223 10*3/MM3 (ref 140–450)
PMV BLD AUTO: 11.1 FL (ref 6–12)
POTASSIUM SERPL-SCNC: 4.1 MMOL/L (ref 3.5–5.2)
PROTHROMBIN TIME: 13.3 SECONDS (ref 11.8–14.9)
RBC # BLD AUTO: 4.38 10*6/MM3 (ref 4.14–5.8)
SODIUM SERPL-SCNC: 143 MMOL/L (ref 136–145)
WBC NRBC COR # BLD: 8.14 10*3/MM3 (ref 3.4–10.8)

## 2022-08-11 PROCEDURE — 0 IOPAMIDOL PER 1 ML: Performed by: INTERNAL MEDICINE

## 2022-08-11 PROCEDURE — 93458 L HRT ARTERY/VENTRICLE ANGIO: CPT | Performed by: INTERNAL MEDICINE

## 2022-08-11 PROCEDURE — 93005 ELECTROCARDIOGRAM TRACING: CPT | Performed by: INTERNAL MEDICINE

## 2022-08-11 PROCEDURE — 93010 ELECTROCARDIOGRAM REPORT: CPT | Performed by: INTERNAL MEDICINE

## 2022-08-11 PROCEDURE — C1894 INTRO/SHEATH, NON-LASER: HCPCS | Performed by: INTERNAL MEDICINE

## 2022-08-11 PROCEDURE — 80048 BASIC METABOLIC PNL TOTAL CA: CPT | Performed by: INTERNAL MEDICINE

## 2022-08-11 PROCEDURE — 25010000002 MIDAZOLAM PER 1 MG: Performed by: INTERNAL MEDICINE

## 2022-08-11 PROCEDURE — 99152 MOD SED SAME PHYS/QHP 5/>YRS: CPT | Performed by: INTERNAL MEDICINE

## 2022-08-11 PROCEDURE — 25010000002 FENTANYL CITRATE (PF) 100 MCG/2ML SOLUTION: Performed by: INTERNAL MEDICINE

## 2022-08-11 PROCEDURE — 85610 PROTHROMBIN TIME: CPT | Performed by: INTERNAL MEDICINE

## 2022-08-11 PROCEDURE — 25010000002 HEPARIN (PORCINE) PER 1000 UNITS: Performed by: INTERNAL MEDICINE

## 2022-08-11 PROCEDURE — 85027 COMPLETE CBC AUTOMATED: CPT | Performed by: INTERNAL MEDICINE

## 2022-08-11 PROCEDURE — C1769 GUIDE WIRE: HCPCS | Performed by: INTERNAL MEDICINE

## 2022-08-11 RX ORDER — FENTANYL CITRATE 50 UG/ML
INJECTION, SOLUTION INTRAMUSCULAR; INTRAVENOUS AS NEEDED
Status: DISCONTINUED | OUTPATIENT
Start: 2022-08-11 | End: 2022-08-11 | Stop reason: HOSPADM

## 2022-08-11 RX ORDER — SODIUM CHLORIDE 9 MG/ML
75 INJECTION, SOLUTION INTRAVENOUS CONTINUOUS
Status: DISCONTINUED | OUTPATIENT
Start: 2022-08-11 | End: 2022-08-11 | Stop reason: HOSPADM

## 2022-08-11 RX ORDER — HEPARIN SODIUM 1000 [USP'U]/ML
INJECTION, SOLUTION INTRAVENOUS; SUBCUTANEOUS AS NEEDED
Status: DISCONTINUED | OUTPATIENT
Start: 2022-08-11 | End: 2022-08-11 | Stop reason: HOSPADM

## 2022-08-11 RX ORDER — MIDAZOLAM HYDROCHLORIDE 1 MG/ML
INJECTION INTRAMUSCULAR; INTRAVENOUS AS NEEDED
Status: DISCONTINUED | OUTPATIENT
Start: 2022-08-11 | End: 2022-08-11 | Stop reason: HOSPADM

## 2022-08-11 RX ORDER — VERAPAMIL HYDROCHLORIDE 2.5 MG/ML
INJECTION, SOLUTION INTRAVENOUS AS NEEDED
Status: DISCONTINUED | OUTPATIENT
Start: 2022-08-11 | End: 2022-08-11 | Stop reason: HOSPADM

## 2022-08-11 RX ORDER — ACETAMINOPHEN 325 MG/1
650 TABLET ORAL EVERY 4 HOURS PRN
Status: DISCONTINUED | OUTPATIENT
Start: 2022-08-11 | End: 2022-08-11 | Stop reason: HOSPADM

## 2022-08-11 RX ORDER — LIDOCAINE HYDROCHLORIDE 20 MG/ML
INJECTION, SOLUTION INFILTRATION; PERINEURAL AS NEEDED
Status: DISCONTINUED | OUTPATIENT
Start: 2022-08-11 | End: 2022-08-11 | Stop reason: HOSPADM

## 2022-08-11 NOTE — INTERVAL H&P NOTE
History reviewed, no additional clinical information since office consultation.  71-year-old with high cardiovascular risk with typical chest pain and abnormal stress test.    Risk and benefits of cardiac catheterization and possible PCI discussed with the patient in the office setting.    Proceed with cardiac catheterization    Derek Francisco MD

## 2022-08-14 LAB — QT INTERVAL: 426 MS

## 2023-04-03 ENCOUNTER — TELEPHONE (OUTPATIENT)
Dept: FAMILY MEDICINE CLINIC | Age: 73
End: 2023-04-03
Payer: COMMERCIAL

## 2023-04-04 ENCOUNTER — OFFICE VISIT (OUTPATIENT)
Dept: FAMILY MEDICINE CLINIC | Age: 73
End: 2023-04-04
Payer: MEDICARE

## 2023-04-04 VITALS
HEIGHT: 72 IN | WEIGHT: 242.4 LBS | SYSTOLIC BLOOD PRESSURE: 153 MMHG | BODY MASS INDEX: 32.83 KG/M2 | DIASTOLIC BLOOD PRESSURE: 82 MMHG | HEART RATE: 64 BPM | OXYGEN SATURATION: 95 % | TEMPERATURE: 98.6 F

## 2023-04-04 DIAGNOSIS — K59.00 CONSTIPATION, UNSPECIFIED CONSTIPATION TYPE: Primary | ICD-10-CM

## 2023-04-04 DIAGNOSIS — I10 PRIMARY HYPERTENSION: ICD-10-CM

## 2023-04-04 DIAGNOSIS — Z12.11 SCREENING FOR COLON CANCER: ICD-10-CM

## 2023-04-04 PROBLEM — I11.9 HYPERTENSIVE HEART DISEASE WITHOUT CONGESTIVE HEART FAILURE: Status: ACTIVE | Noted: 2022-11-21

## 2023-04-04 PROBLEM — E55.9 VITAMIN D DEFICIENCY: Status: ACTIVE | Noted: 2021-02-15

## 2023-04-04 PROBLEM — N41.1 CHRONIC PROSTATITIS: Status: ACTIVE | Noted: 2021-02-15

## 2023-04-04 PROBLEM — J44.9 CHRONIC OBSTRUCTIVE LUNG DISEASE: Status: ACTIVE | Noted: 2021-02-15

## 2023-04-04 PROCEDURE — 3077F SYST BP >= 140 MM HG: CPT | Performed by: NURSE PRACTITIONER

## 2023-04-04 PROCEDURE — 99214 OFFICE O/P EST MOD 30 MIN: CPT | Performed by: NURSE PRACTITIONER

## 2023-04-04 PROCEDURE — 3079F DIAST BP 80-89 MM HG: CPT | Performed by: NURSE PRACTITIONER

## 2023-04-04 PROCEDURE — 1159F MED LIST DOCD IN RCRD: CPT | Performed by: NURSE PRACTITIONER

## 2023-04-04 PROCEDURE — 1160F RVW MEDS BY RX/DR IN RCRD: CPT | Performed by: NURSE PRACTITIONER

## 2023-04-04 RX ORDER — POLYETHYLENE GLYCOL 3350 17 G/17G
17 POWDER, FOR SOLUTION ORAL AS NEEDED
COMMUNITY

## 2023-04-04 RX ORDER — LACTULOSE 10 G/15ML
20 SOLUTION ORAL 2 TIMES DAILY PRN
Qty: 473 ML | Refills: 2 | Status: SHIPPED | OUTPATIENT
Start: 2023-04-04

## 2023-04-04 RX ORDER — TRAMADOL HYDROCHLORIDE 50 MG/1
TABLET ORAL
COMMUNITY
Start: 2023-02-12 | End: 2023-04-04 | Stop reason: SDUPTHER

## 2023-04-04 RX ORDER — LOSARTAN POTASSIUM 100 MG/1
100 TABLET ORAL DAILY
COMMUNITY
Start: 2023-02-26

## 2023-04-04 NOTE — PROGRESS NOTES
Chief Complaint  Martir Porter presents to [facility] for Constipation (Pt states that Digestive system isn't as efficient as it should be  /Last BM was yesterday, due to enema /Currently taking fiber & miralax )      Subjective     History of Present Illness  Constipation: Patient complains of constipation.  Stool pattern has been 2-3  firm stool(s) per week. Onset was several years ago Defecation has been difficult. current medication tramadol. Symptoms have been gradually worsening. Current Health Habits: Eating fiber? yes and taking supplement,   Exercise? no Water intake? Not much to speak of Current OTC/RX therapy has been bulk (psyllium) and osmotic miralax which has been ineffective.  Mr. Porter reports that he may drink 1 cup of coffee a day and may be a couple of sips of water at the end of the day worse bright but his overall fluid intake is very low.  He did have this problem with constipation and potential impaction in the past and we did treat him with lactulose at the time and he reports that this was very effective for him and he is requesting refill on this medication for as needed use.  According to our records his last colonoscopy was 2016 he would like to go on and have a follow-up colonoscopy screening as he will be reaching his age limit for follow-up screenings.          Assessment and Plan       Diagnoses and all orders for this visit:    1. Constipation, unspecified constipation type (Primary)  Comments:  I have advised to increase his water intake if he is going to continue using fiber supplements as needed lactulose to use very sparingly referral for colon scre  Orders:  -     lactulose (CHRONULAC) 10 GM/15ML solution; Take 30 mL by mouth 2 (Two) Times a Day As Needed (constipation).  Dispense: 473 mL; Refill: 2  -     Ambulatory Referral to Gastroenterology    2. Primary hypertension  Comments:  Blood pressure is elevated today recommend home  "monitoring follow-up with PCP    3. Screening for colon cancer  -     Ambulatory Referral to Gastroenterology        Follow Up   Return if symptoms worsen or fail to improve, for Follow up with PCP as recommended.      New Medications Ordered This Visit   Medications   • lactulose (CHRONULAC) 10 GM/15ML solution     Sig: Take 30 mL by mouth 2 (Two) Times a Day As Needed (constipation).     Dispense:  473 mL     Refill:  2       Medications Discontinued During This Encounter   Medication Reason   • aspirin 81 MG EC tablet Discontinued by another clinician   • B Complex Vitamins (VITAMIN B COMPLEX PO) Non-compliance   • cholecalciferol (VITAMIN D3) 25 MCG (1000 UT) tablet Non-compliance   • Diclofenac Sodium (VOLTAREN) 1 % gel gel *Therapy completed   • loratadine (CLARITIN) 10 MG tablet *Therapy completed   • losartan (COZAAR) 50 MG tablet Duplicate order   • Lidocaine, Anorectal, (LMX 5) 5 % cream cream *Therapy completed   • traMADol (ULTRAM) 50 MG tablet Duplicate order   • tamsulosin (FLOMAX) 0.4 MG capsule 24 hr capsule Discontinued by another clinician   • FIBER ADULT GUMMIES PO *Error       I spent 39 minutes caring for Martir on this date of service. This time includes time spent by me in the following activities:obtaining and/or reviewing a separately obtained history, performing a medically appropriate examination and/or evaluation , counseling and educating the patient/family/caregiver, ordering medications, tests, or procedures and documenting information in the medical record     Review of Systems    Objective     Vitals:    04/04/23 1110   BP: 153/82   BP Location: Right arm   Patient Position: Sitting   Cuff Size: Adult   Pulse: 64   Temp: 98.6 °F (37 °C)   TempSrc: Oral   SpO2: 95%   Weight: 110 kg (242 lb 6.4 oz)   Height: 182.9 cm (72\")     Body mass index is 32.88 kg/m².     Physical Exam       Result Review                       No Known Allergies   Past Medical History:   Diagnosis Date   • " Abnormal ECG    • GERD (gastroesophageal reflux disease)    • Pneumonia      Current Outpatient Medications   Medication Sig Dispense Refill   • amLODIPine (NORVASC) 5 MG tablet Take 1 tablet by mouth Daily.     • atenolol (TENORMIN) 50 MG tablet Take 1 tablet by mouth Daily.     • losartan (COZAAR) 100 MG tablet Take 1 tablet by mouth Daily.     • methylcellulose oral powder Take 1 application by mouth Daily. 1 scoop daily     • nitroglycerin (NITROSTAT) 0.4 MG SL tablet Place 1 tablet under the tongue Every 5 (Five) Minutes As Needed.     • omeprazole (priLOSEC) 20 MG capsule Take 1 capsule by mouth Daily.     • polyethylene glycol (MiraLax) 17 GM/SCOOP powder Take 17 g by mouth As Needed.     • traMADol HCl (ULTRAM) 100 MG tablet Take 50 mg by mouth Daily.     • vitamin C (ASCORBIC ACID) 500 MG tablet Take 1 tablet by mouth Daily.     • lactulose (CHRONULAC) 10 GM/15ML solution Take 30 mL by mouth 2 (Two) Times a Day As Needed (constipation). 473 mL 2     No current facility-administered medications for this visit.     Past Surgical History:   Procedure Laterality Date   • CARDIAC CATHETERIZATION N/A 8/11/2022    Procedure: Left Heart Cath;  Surgeon: MONY Francisco MD;  Location: [facility] CATH INVASIVE LOCATION;  Service: Cardiology;  Laterality: N/A;      Health Maintenance Due   Topic Date Due   • ZOSTER VACCINE (1 of 2) Never done   • TDAP/TD VACCINES (2 - Td or Tdap) 08/01/2017   • HEPATITIS C SCREENING  Never done   • COVID-19 Vaccine (4 - Booster for Moderna series) 03/08/2022      Immunization History   Administered Date(s) Administered   • COVID-19 (MODERNA) 1st, 2nd, 3rd Dose Only 02/02/2021, 03/12/2021, 01/11/2022   • FluLaval/Fluzone >6mos 10/20/2021   • Fluzone High Dose =>65 Years (Vaxcare ONLY) 01/27/2018, 10/21/2018   • Pneumococcal Conjugate 13-Valent (PCV13) 12/01/2015   • Pneumococcal Polysaccharide (PPSV23) 05/08/2017   • Td, Not Adsorbed 02/01/2005   • Tdap 08/01/2007   • flucelvax quad pfs =>4  YRS 11/13/2020         Part of this note may be an electronic transcription/translation of spoken language to printed   text using the Dragon Dictation System.      Yanira Murry APRN

## 2023-05-17 ENCOUNTER — OFFICE VISIT (OUTPATIENT)
Dept: GASTROENTEROLOGY | Facility: CLINIC | Age: 73
End: 2023-05-17
Payer: MEDICARE

## 2023-05-17 ENCOUNTER — PATIENT ROUNDING (BHMG ONLY) (OUTPATIENT)
Dept: GASTROENTEROLOGY | Facility: CLINIC | Age: 73
End: 2023-05-17
Payer: COMMERCIAL

## 2023-05-17 VITALS
DIASTOLIC BLOOD PRESSURE: 71 MMHG | BODY MASS INDEX: 32.57 KG/M2 | HEART RATE: 55 BPM | SYSTOLIC BLOOD PRESSURE: 141 MMHG | TEMPERATURE: 96.3 F | HEIGHT: 72 IN | WEIGHT: 240.5 LBS | OXYGEN SATURATION: 96 %

## 2023-05-17 DIAGNOSIS — K59.04 CHRONIC IDIOPATHIC CONSTIPATION: Primary | ICD-10-CM

## 2023-05-17 PROCEDURE — 1159F MED LIST DOCD IN RCRD: CPT | Performed by: INTERNAL MEDICINE

## 2023-05-17 PROCEDURE — 1160F RVW MEDS BY RX/DR IN RCRD: CPT | Performed by: INTERNAL MEDICINE

## 2023-05-17 PROCEDURE — 3078F DIAST BP <80 MM HG: CPT | Performed by: INTERNAL MEDICINE

## 2023-05-17 PROCEDURE — 99204 OFFICE O/P NEW MOD 45 MIN: CPT | Performed by: INTERNAL MEDICINE

## 2023-05-17 PROCEDURE — 3077F SYST BP >= 140 MM HG: CPT | Performed by: INTERNAL MEDICINE

## 2023-05-17 RX ORDER — SODIUM CHLORIDE, SODIUM LACTATE, POTASSIUM CHLORIDE, CALCIUM CHLORIDE 600; 310; 30; 20 MG/100ML; MG/100ML; MG/100ML; MG/100ML
30 INJECTION, SOLUTION INTRAVENOUS CONTINUOUS
OUTPATIENT
Start: 2023-07-31

## 2023-05-17 RX ORDER — TRAMADOL HYDROCHLORIDE 50 MG/1
50 TABLET ORAL 2 TIMES DAILY
COMMUNITY
Start: 2023-04-04

## 2023-05-17 RX ORDER — VITAMIN B COMPLEX
CAPSULE ORAL DAILY
COMMUNITY

## 2023-05-17 NOTE — PROGRESS NOTES
Chief Complaint   Patient presents with   • Diarrhea   • Constipation   • Abdominal Pain     Subjective   HPI  Martir Porter is a 72 y.o. male who presents today for new patient evaluation.    Referred by PCP to discuss need for colonoscopy.  He has a longstanding history of fluctuation of bowel habit although with constipation predominance.  Symptoms seem to been worse the early part of this year although he does note improvement over the last few weeks.  He reports he has been drinking more water and has started taking Benefiber regularly.  He was also given a prescription for lactulose but only took this for a few days.    From best I can tell per review of records his last colonoscopy was performed in 2016 and appears to have been normal.  Denies any blood or mucus in his stool.  Hx of GERD controlled on low dose Omeprazole  EGD 2016 at [facility] with bland pathology.  No Barretts.     Objective   Vitals:    05/17/23 1023   BP: 141/71   Pulse: 55   Temp: 96.3 °F (35.7 °C)   SpO2: 96%     Physical Exam  Vitals reviewed.   Constitutional:       Appearance: He is well-developed.   HENT:      Head: Normocephalic and atraumatic.   Neurological:      Mental Status: He is alert and oriented to person, place, and time.   Psychiatric:         Behavior: Behavior normal.         Thought Content: Thought content normal.         Judgment: Judgment normal.              Assessment & Plan   Assessment:     1. Chronic idiopathic constipation      Plan:   72-year-old gentleman with chronic constipation with some exacerbation of symptoms earlier this year.  Seems to be improving with dietary fiber and increased water intake but given that he is over 7 years out from last colonoscopy would recommend we update this.  Case request submitted.           Pollo Choe M.D.  [facility]  [address] [place] [address]  Office: [phone]

## 2023-05-17 NOTE — PROGRESS NOTES
May 17, 2023    Hello, may I speak with Martir Porter?    My name is Sadnra    I am  with [facility]  [address]  [facility] [address] [phone].    Before we get started may I verify your date of birth? 1950    I am calling to officially welcome you to our practice and ask about your recent visit. Is this a good time to talk? Yes    Tell me about your visit with us. What things went well?  I think it went fine. I think the visit went really well       We're always looking for ways to make our patients' experiences even better. Do you have recommendations on ways we may improve?  No    Overall were you satisfied with your first visit to our practice? Yes       I appreciate you taking the time to speak with me today. Is there anything else I can do for you? No      Thank you, and have a great day.

## 2023-06-06 ENCOUNTER — PROCEDURE VISIT (OUTPATIENT)
Dept: OTOLARYNGOLOGY | Facility: CLINIC | Age: 73
End: 2023-06-06
Payer: MEDICARE

## 2023-06-06 ENCOUNTER — OFFICE VISIT (OUTPATIENT)
Dept: OTOLARYNGOLOGY | Facility: CLINIC | Age: 73
End: 2023-06-06
Payer: MEDICARE

## 2023-06-06 VITALS — WEIGHT: 237.2 LBS | HEIGHT: 72 IN | TEMPERATURE: 97.4 F | BODY MASS INDEX: 32.13 KG/M2

## 2023-06-06 DIAGNOSIS — H90.A31 MIXED CONDUCTIVE AND SENSORINEURAL HEARING LOSS OF RIGHT EAR WITH RESTRICTED HEARING OF LEFT EAR: Primary | ICD-10-CM

## 2023-06-06 DIAGNOSIS — H93.11 TINNITUS OF RIGHT EAR: ICD-10-CM

## 2023-06-06 DIAGNOSIS — H90.A22 SENSORINEURAL HEARING LOSS (SNHL) OF LEFT EAR WITH RESTRICTED HEARING OF RIGHT EAR: ICD-10-CM

## 2023-06-06 DIAGNOSIS — H90.3 SENSORINEURAL HEARING LOSS (SNHL) OF BOTH EARS: ICD-10-CM

## 2023-06-06 DIAGNOSIS — H93.11 TINNITUS OF RIGHT EAR: Primary | ICD-10-CM

## 2023-06-06 PROCEDURE — 92557 COMPREHENSIVE HEARING TEST: CPT | Performed by: AUDIOLOGIST

## 2023-06-06 PROCEDURE — 92567 TYMPANOMETRY: CPT | Performed by: AUDIOLOGIST

## 2023-06-06 RX ORDER — OMEPRAZOLE 40 MG/1
CAPSULE, DELAYED RELEASE ORAL
COMMUNITY
Start: 2023-05-23

## 2023-06-06 NOTE — PROGRESS NOTES
"AUDIOMETRIC EVALUATION      Name:  Martir Porter  :  1950  Age:  72 y.o.  Date of Evaluation:  2023       History:  Mr. Porter is seen today for a hearing evaluation due to \"roaring \"right ear.    Audiologic Information:  Concerns for Hearing: Yes  PETs: No  Other otologic surgical history: No  Aural Pressure/Fullness: No  Otalgia: No  Otorrhea: No  Tinnitus: Roaring at the right ear  Dizziness: No  Noise Exposure: None  Family history of hearing loss: No  Head trauma requiring hospital stay: No  Chemotherapy: No  Other significant history: No    **Case history obtained in office and through EMR system      EVALUATION:    See audiogram    RESULTS:    Otoscopic Evaluation:          NOTE: Testing completed after ears were examined by Dr. Alberts.    Tympanometry (226 Hz):  Right: Type A  Left: Type C      IMPRESSIONS:  Pure tone thresholds for the right ear shows mild sloping to profound mixed hearing loss.  Pure tone thresholds for the left ear shows mild sloping to profound sensorineural hearing loss with fair to poor word recognition.  Patient was counseled with regard to the findings.    Amplification needs: Hearing aid evaluation following medical treatment.      RECOMMENDATIONS/PLAN:  Follow-up recommendations per Dr. Larios.  Hearing aid evaluation and counseling upon medical clearance and patient motivation. Patient provided contact information for an audiologist in [facility].  Discussed results and recommendations with patient. Questions were addressed and the patient was encouraged to contact our department should concerns arise.          Ruben Webb M.S, [facility]  Licensed Audiologist  "

## 2023-06-06 NOTE — PROGRESS NOTES
Patient Name: Martir Porter   Visit Date: 06/06/2023   Patient ID: [id number]  Provider: Jasvir Alberts MD    Sex: male  Location: [facility]   YOB: 1950  Location Address: [address] [place] [address], [address] [place] [address],?[place]?[address]    Primary Care Provider Sarwat Iniguez MD  Location Phone: [phone]    Referring Provider: No ref. provider found        Chief Complaint  fluid in ears with roaring sound  (New patient )    Subjective    History of Present Illness  Martir oPrter is a 72 y.o. male who presents to [facility] today as a consult from No ref. provider found.    He presents to the clinic today for evaluation of gradual decrease in hearing and roaring sound mostly on the right side.  He was seen by his primary care physician who noted fluid on the right middle ear and he was given what sounds like a steroid shot for this.  He notes no significant improvement or changes.  He denies any major pain or pressure issues.  He denies any major ear history.  Notes that he does have difficulty with his hearing in day-to-day situations and this has been gradually worsening.  He has not had a hearing test in quite some time.    Past Medical History:   Diagnosis Date    Abnormal ECG     GERD (gastroesophageal reflux disease)     Pneumonia        Past Surgical History:   Procedure Laterality Date    CARDIAC CATHETERIZATION N/A 8/11/2022    Procedure: Left Heart Cath;  Surgeon: MONY Francisco MD;  Location: [facility] CATH INVASIVE LOCATION;  Service: Cardiology;  Laterality: N/A;         Current Outpatient Medications:     amLODIPine (NORVASC) 5 MG tablet, Take 1 tablet by mouth Daily., Disp: , Rfl:     atenolol (TENORMIN) 50 MG tablet, Take 1 tablet by mouth Daily., Disp: , Rfl:     B Complex Vitamins (vitamin b complex) capsule capsule, Take  by mouth Daily., Disp: , Rfl:     lactulose (CHRONULAC) 10 GM/15ML solution, Take 30 mL by  "mouth 2 (Two) Times a Day As Needed (constipation)., Disp: 473 mL, Rfl: 2    losartan (COZAAR) 100 MG tablet, Take 1 tablet by mouth Daily., Disp: , Rfl:     nitroglycerin (NITROSTAT) 0.4 MG SL tablet, Place 1 tablet under the tongue Every 5 (Five) Minutes As Needed., Disp: , Rfl:     omeprazole (priLOSEC) 40 MG capsule, , Disp: , Rfl:     traMADol (ULTRAM) 50 MG tablet, Take 1 tablet by mouth 2 (Two) Times a Day., Disp: , Rfl:     vitamin C (ASCORBIC ACID) 500 MG tablet, Take 1 tablet by mouth Daily., Disp: , Rfl:     vitamin D3 125 MCG (5000 UT) capsule capsule, Take 1 capsule by mouth Daily., Disp: , Rfl:     polyethylene glycol (MIRALAX) 17 GM/SCOOP powder, Take 17 g by mouth As Needed. (Patient not taking: Reported on 6/6/2023), Disp: , Rfl:      No Known Allergies    Family History   Problem Relation Age of Onset    Stroke Mother     Aneurysm Father         Social History     Social History Narrative    Not on file       Objective     Vital Signs:   Temp 97.4 °F (36.3 °C) (Tympanic)   Ht 182.9 cm (72.01\")   Wt 108 kg (237 lb 3.2 oz)   BMI 32.16 kg/m²       Physical Exam    $ Binocular Microscopy    Date/Time: 6/6/2023 3:35 PM  Performed by: Jasvir Alberts MD  Authorized by: Jasvir Alberts MD     Ear examination was performed utilizing binocular microscopy.    Comments:      Normal ear exam bilaterally with no evidence of fluid or infection.    Constitutional   Appearance  : well developed, well-nourished, alert and in no acute distress, voice clear and strong    Head  Inspection  : no deformities or lesions  Face  Inspection  : No facial lesions; House-Brackmann I/VI bilaterally  Palpation  : No TMJ crepitus nor  muscle tenderness bilaterally    Eyes  Vision  Visual Fields  : Extraocular movements are intact. No spontaneous or gaze-induced nystagmus.  Conjunctivae  : clear  Sclerae  : clear  Pupils and Irises  : pupils equal, round, and reactive to light.     Ears, Nose, Mouth and " Throat    Ears    External Ears  : appearance within normal limits, no lesions present  Otoscopic Examination  : Tympanic membrane appearance within normal limits bilaterally without perforations, well-aerated middle ears  Hearing  : intact to conversational voice both ears  Tunning fork testing:     :    Nose    External Nose  : appearance normal  Intranasal Exam  : mucosa within normal limits, vestibules normal, no intranasal lesions present, septum midline, sinuses non tender to percussion  Oral Cavity    Oral Mucosa  : oral mucosa normal without pallor or cyanosis  Lips  : lip appearance normal  Teeth  : normal dentition for age  Gums  : gums pink, non-swollen, no bleeding present  Tongue  : tongue appearance normal; normal mobility  Palate  : hard palate normal, soft palate appearance normal with symmetric mobility    Throat    Oropharynx  : no inflammation or lesions present, tonsils within normal limits  Hypopharynx  : appearance within normal limits, superior epiglottis within normal limits  Larynx  : appearance within normal limits, vocal cords within normal limits, no lesions present    Neck  Inspection/Palpation  : normal appearance, no masses or tenderness, trachea midline; thyroid size normal, nontender, no nodules or masses present on palpation    Respiratory  Respiratory Effort  : breathing unlabored  Inspection of Chest  : normal appearance, no retractions    Cardiovascular  Heart  : regular rate and rhythm    Lymphatic  Neck  : no lymphadenopathy present  Supraclavicular Nodes  : no lymphadenopathy present  Preauricular Nodes  : no lymphadenopathy present    Skin and Subcutaneous Tissue  General Inspection  : Regarding face and neck - there are no rashes present, no lesions present, and no areas of discoloration    Neurologic  Cranial Nerves  : cranial nerves II-XII are grossly intact bilaterally  Gait and Station  : normal gait, able to stand without diffculty    Psychiatric  Judgement and  Insight  : judgment and insight intact  Mood and Affect  : mood normal, affect appropriate          Assessment and Plan    Diagnoses and all orders for this visit:    1. Tinnitus of right ear (Primary)  -     Comprehensive Hearing Test; Future  -     Tympanometry; Future    2. Sensorineural hearing loss (SNHL) of both ears  -     Comprehensive Hearing Test; Future  -     Tympanometry; Future    Examination today reveals completely normal ear exam with no evidence of fluid or infection.  I recommended an audiogram be performed and this was done today in the clinic.  This reveals normal hearing in the right ear sloping down to profound mixed hearing loss with mostly sensorineural component.  In the left ear there is mild sloping to profound sensorineural hearing loss with word discrimination scores of 87% and 53% in the right and left ears respectively.  Tympanogram reveals normal response on the right and slight negative pressure on the left.  I discussed the findings with him and did recommend hearing aid trial, especially for the right ear.  We discussed tinnitus and I recommended a trial of melatonin and background noise distraction techniques.  If there is any worsening he may need to have his hearing retested with any progressive symptoms.    Follow Up   No follow-ups on file.  Patient was given instructions and counseling regarding his condition or for health maintenance advice. Please see specific information pulled into the AVS if appropriate.

## 2023-07-21 ENCOUNTER — TELEPHONE (OUTPATIENT)
Dept: GASTROENTEROLOGY | Facility: CLINIC | Age: 73
End: 2023-07-21
Payer: COMMERCIAL

## 2023-07-21 NOTE — TELEPHONE ENCOUNTER
PT CALLED BACK TO CONFIRM HIS PROCEDURE ON 7/31 BUT HE IS WONDERING IF HE CAN HAVE AN EGD DONE AT THE SAME TIME.  WILL YOU CALL HIM BACK.  HIS NUMBER [phone] S

## 2023-07-24 ENCOUNTER — TELEPHONE (OUTPATIENT)
Dept: GASTROENTEROLOGY | Facility: CLINIC | Age: 73
End: 2023-07-24
Payer: COMMERCIAL

## 2023-07-24 NOTE — TELEPHONE ENCOUNTER
Hub staff attempted to follow warm transfer process and was unsuccessful     Caller: Martir Porter    Relationship to patient: Self    Best call back number: [phone]    Patient is needing: PT RETURNED CALL THINKS IS ABOUT THE REQUEST FOR AN EGD TO BE DONE WITH HIS COLONOSCOPY 7/31/23 BECAUSE HE IS HAVING PROBLEMS WITH DIGESTION.    PLEASE REACH OUT TO PATIENT AGAIN. THANK YOU

## 2023-07-28 ENCOUNTER — TELEPHONE (OUTPATIENT)
Dept: GASTROENTEROLOGY | Facility: CLINIC | Age: 73
End: 2023-07-28
Payer: COMMERCIAL

## 2023-07-28 NOTE — TELEPHONE ENCOUNTER
PT NEEDS TO CANCEL HIS 7/31 PROCEDURE. HE IS DOWN WITH HIS BACK. HE WOULD LIKE A CALL BACK TO RESCHEDULE.

## 2023-08-23 ENCOUNTER — HOSPITAL ENCOUNTER (OUTPATIENT)
Facility: HOSPITAL | Age: 73
Setting detail: HOSPITAL OUTPATIENT SURGERY
Discharge: HOME | End: 2023-08-23
Attending: INTERNAL MEDICINE | Admitting: INTERNAL MEDICINE
Payer: MEDICARE

## 2023-08-23 ENCOUNTER — ANESTHESIA EVENT (OUTPATIENT)
Dept: GASTROENTEROLOGY | Facility: HOSPITAL | Age: 73
End: 2023-08-23
Payer: MEDICARE

## 2023-08-23 ENCOUNTER — ANESTHESIA (OUTPATIENT)
Dept: GASTROENTEROLOGY | Facility: HOSPITAL | Age: 73
End: 2023-08-23
Payer: MEDICARE

## 2023-08-23 VITALS
BODY MASS INDEX: 31.19 KG/M2 | WEIGHT: 230 LBS | DIASTOLIC BLOOD PRESSURE: 79 MMHG | HEART RATE: 57 BPM | RESPIRATION RATE: 16 BRPM | SYSTOLIC BLOOD PRESSURE: 135 MMHG | OXYGEN SATURATION: 96 %

## 2023-08-23 DIAGNOSIS — K59.04 CHRONIC IDIOPATHIC CONSTIPATION: ICD-10-CM

## 2023-08-23 PROCEDURE — 25010000002 PROPOFOL 10 MG/ML EMULSION: Performed by: NURSE ANESTHETIST, CERTIFIED REGISTERED

## 2023-08-23 PROCEDURE — G0121 COLON CA SCRN NOT HI RSK IND: HCPCS | Performed by: INTERNAL MEDICINE

## 2023-08-23 RX ORDER — PROPOFOL 10 MG/ML
VIAL (ML) INTRAVENOUS CONTINUOUS PRN
Status: DISCONTINUED | OUTPATIENT
Start: 2023-08-23 | End: 2023-08-23 | Stop reason: SURG

## 2023-08-23 RX ORDER — LIDOCAINE HYDROCHLORIDE 20 MG/ML
INJECTION, SOLUTION INFILTRATION; PERINEURAL AS NEEDED
Status: DISCONTINUED | OUTPATIENT
Start: 2023-08-23 | End: 2023-08-23 | Stop reason: SURG

## 2023-08-23 RX ORDER — PROPOFOL 10 MG/ML
VIAL (ML) INTRAVENOUS AS NEEDED
Status: DISCONTINUED | OUTPATIENT
Start: 2023-08-23 | End: 2023-08-23 | Stop reason: SURG

## 2023-08-23 RX ORDER — SODIUM CHLORIDE, SODIUM LACTATE, POTASSIUM CHLORIDE, CALCIUM CHLORIDE 600; 310; 30; 20 MG/100ML; MG/100ML; MG/100ML; MG/100ML
30 INJECTION, SOLUTION INTRAVENOUS CONTINUOUS
Status: DISCONTINUED | OUTPATIENT
Start: 2023-08-23 | End: 2023-08-23 | Stop reason: HOSPADM

## 2023-08-23 RX ADMIN — LIDOCAINE HYDROCHLORIDE 60 MG: 20 INJECTION, SOLUTION INFILTRATION; PERINEURAL at 14:13

## 2023-08-23 RX ADMIN — Medication 100 MG: at 14:13

## 2023-08-23 RX ADMIN — SODIUM CHLORIDE, POTASSIUM CHLORIDE, SODIUM LACTATE AND CALCIUM CHLORIDE 30 ML/HR: 600; 310; 30; 20 INJECTION, SOLUTION INTRAVENOUS at 13:56

## 2023-08-23 RX ADMIN — PROPOFOL 180 MCG/KG/MIN: 10 INJECTION, EMULSION INTRAVENOUS at 14:13

## 2023-08-23 NOTE — ANESTHESIA PREPROCEDURE EVALUATION
Anesthesia Evaluation     Patient summary reviewed and Nursing notes reviewed                Airway   Mallampati: II  TM distance: >3 FB  Neck ROM: full  Dental    (+) upper dentures    Pulmonary    (+) COPD,  Cardiovascular     ECG reviewed  Rhythm: regular  Rate: normal    (+) hypertension, angina      Neuro/Psych- negative ROS  GI/Hepatic/Renal/Endo    (+) GERD    Musculoskeletal (-) negative ROS    Abdominal    Substance History - negative use     OB/GYN negative ob/gyn ROS         Other                      Anesthesia Plan    ASA 3     MAC     intravenous induction     Anesthetic plan, risks, benefits, and alternatives have been provided, discussed and informed consent has been obtained with: patient.    Plan discussed with CRNA.    CODE STATUS:

## 2023-08-23 NOTE — ANESTHESIA POSTPROCEDURE EVALUATION
Patient: Martir Porter    Procedure Summary       Date: 08/23/23 Room / Location: [facility] ENDOSCOPY 8 /  [facility] END[facility]    Anesthesia Start: 1410 Anesthesia Stop: 1423    Procedure: COLONOSCOPY TO CECUM Diagnosis:       Chronic idiopathic constipation      (Chronic idiopathic constipation [K59.04])    Surgeons: Pollo Choe MD Provider: Collin Umana MD    Anesthesia Type: MAC ASA Status: 3            Anesthesia Type: MAC    Vitals  Vitals Value Taken Time   /75 08/23/23 1436   Temp     Pulse 58 08/23/23 1436   Resp 16 08/23/23 1436   SpO2 96 % 08/23/23 1436           Post Anesthesia Care and Evaluation    Patient location during evaluation: PACU  Patient participation: complete - patient participated  Level of consciousness: awake and alert  Pain management: adequate    Airway patency: patent  Anesthetic complications: No anesthetic complications    Cardiovascular status: acceptable  Respiratory status: acceptable  Hydration status: acceptable    Comments: --------------------            08/23/23               1436     --------------------   BP:       123/75     Pulse:      58       Resp:       16       SpO2:      96%      --------------------

## 2023-08-23 NOTE — DISCHARGE INSTRUCTIONS
For the next 24 hours patient needs to be with a responsible adult.    For 24 hours DO NOT drive, operate machinery, appliances, drink alcohol, make important decisions or sign legal documents.    Start with a light or bland diet if you are feeling sick to your stomach otherwise advance to regular diet as tolerated.    Follow recommendations on procedure report if provided by your doctor.    Call Dr Choe for problems [phone]     Problems may include but not limited to: large amounts of bleeding, trouble breathing, repeated vomiting, severe unrelieved pain, fever or chills.

## 2023-08-23 NOTE — H&P
[facility]  Pre Procedure History & Physical    Chief Complaint:   Constipation    Subjective     HPI:   72 y.o. male here for colonoscopy for constipation    Past Medical History:   Past Medical History:   Diagnosis Date    Abnormal ECG     GERD (gastroesophageal reflux disease)     History of skin cancer     Hypertension     Personal history of COVID-19 02/2021    Pneumonia        Past Surgical History:  Past Surgical History:   Procedure Laterality Date    CARDIAC CATHETERIZATION N/A 08/11/2022    Procedure: Left Heart Cath;  Surgeon: MONY Francisco MD;  Location: [facility] CATH INVASIVE LOCATION;  Service: Cardiology;  Laterality: N/A;    CHOLECYSTECTOMY      COLONOSCOPY      ENDOSCOPY      HEMORROIDECTOMY      SKIN BIOPSY         Family History:  Family History   Problem Relation Age of Onset    Stroke Mother     Aneurysm Father        Social History:   reports that he has quit smoking. He has never used smokeless tobacco. He reports that he does not drink alcohol and does not use drugs.    Medications:   Medications Prior to Admission   Medication Sig Dispense Refill Last Dose    amLODIPine (NORVASC) 5 MG tablet Take 1 tablet by mouth Daily.   8/23/2023    atenolol (TENORMIN) 50 MG tablet Take 1 tablet by mouth Daily.   8/22/2023    losartan (COZAAR) 100 MG tablet Take 1 tablet by mouth Daily.   8/23/2023    omeprazole (priLOSEC) 40 MG capsule    8/22/2023    polyethylene glycol (MIRALAX) 17 GM/SCOOP powder Take 17 g by mouth As Needed.   Past Week    traMADol (ULTRAM) 50 MG tablet Take 1 tablet by mouth 2 (Two) Times a Day.   8/23/2023    vitamin D3 125 MCG (5000 UT) capsule capsule Take 1 capsule by mouth Daily.   8/22/2023    B Complex Vitamins (vitamin b complex) capsule capsule Take  by mouth Daily.   Unknown    lactulose (CHRONULAC) 10 GM/15ML solution Take 30 mL by mouth 2 (Two) Times a Day As Needed (constipation). 473 mL 2 Unknown    nitroglycerin (NITROSTAT) 0.4 MG SL tablet  Place 1 tablet under the tongue Every 5 (Five) Minutes As Needed.       vitamin C (ASCORBIC ACID) 500 MG tablet Take 1 tablet by mouth Daily.   Unknown       Allergies:  Patient has no known allergies.    ROS:    Pertinent items are noted in HPI     Objective     Blood pressure 140/72, pulse 63, resp. rate 16, weight 104 kg (230 lb), SpO2 95 %.    Physical Exam   Constitutional: Pt is oriented to person, place, and time and well-developed, well-nourished, and in no distress.   Mouth/Throat: Oropharynx is clear and moist.   Neck: Normal range of motion.   Cardiovascular: Normal rate, regular rhythm and normal heart sounds.    Pulmonary/Chest: Effort normal and breath sounds normal.   Abdominal: Soft. Nontender  Skin: Skin is warm and dry.   Psychiatric: Mood, memory, affect and judgment normal.     Assessment & Plan     Diagnosis:  Constipation    Anticipated Surgical Procedure:  Colonoscopy    The risks, benefits, and alternatives of this procedure have been discussed with the patient or the responsible party- the patient understands and agrees to proceed.

## 2024-02-20 ENCOUNTER — PRE-ADMISSION TESTING (OUTPATIENT)
Dept: PREADMISSION TESTING | Facility: HOSPITAL | Age: 74
End: 2024-02-20
Payer: MEDICARE

## 2024-02-20 VITALS
SYSTOLIC BLOOD PRESSURE: 160 MMHG | BODY MASS INDEX: 31.95 KG/M2 | OXYGEN SATURATION: 98 % | TEMPERATURE: 97 F | DIASTOLIC BLOOD PRESSURE: 76 MMHG | HEART RATE: 74 BPM | WEIGHT: 235.9 LBS | HEIGHT: 72 IN | RESPIRATION RATE: 16 BRPM

## 2024-02-20 LAB
ANION GAP SERPL CALCULATED.3IONS-SCNC: 11.4 MMOL/L (ref 5–15)
BUN SERPL-MCNC: 20 MG/DL (ref 8–23)
BUN/CREAT SERPL: 15 (ref 7–25)
CALCIUM SPEC-SCNC: 9 MG/DL (ref 8.6–10.5)
CHLORIDE SERPL-SCNC: 104 MMOL/L (ref 98–107)
CO2 SERPL-SCNC: 24.6 MMOL/L (ref 22–29)
CREAT SERPL-MCNC: 1.33 MG/DL (ref 0.76–1.27)
DEPRECATED RDW RBC AUTO: 39.1 FL (ref 37–54)
EGFRCR SERPLBLD CKD-EPI 2021: 55.7 ML/MIN/1.73
ERYTHROCYTE [DISTWIDTH] IN BLOOD BY AUTOMATED COUNT: 12.6 % (ref 12.3–15.4)
GLUCOSE SERPL-MCNC: 99 MG/DL (ref 65–99)
HCT VFR BLD AUTO: 37.5 % (ref 37.5–51)
HGB BLD-MCNC: 12.6 G/DL (ref 13–17.7)
MCH RBC QN AUTO: 29 PG (ref 26.6–33)
MCHC RBC AUTO-ENTMCNC: 33.6 G/DL (ref 31.5–35.7)
MCV RBC AUTO: 86.2 FL (ref 79–97)
PLATELET # BLD AUTO: 228 10*3/MM3 (ref 140–450)
PMV BLD AUTO: 11 FL (ref 6–12)
POTASSIUM SERPL-SCNC: 4.1 MMOL/L (ref 3.5–5.2)
QT INTERVAL: 440 MS
QTC INTERVAL: 451 MS
RBC # BLD AUTO: 4.35 10*6/MM3 (ref 4.14–5.8)
SODIUM SERPL-SCNC: 140 MMOL/L (ref 136–145)
WBC NRBC COR # BLD AUTO: 8.94 10*3/MM3 (ref 3.4–10.8)

## 2024-02-20 PROCEDURE — 36415 COLL VENOUS BLD VENIPUNCTURE: CPT

## 2024-02-20 PROCEDURE — 93005 ELECTROCARDIOGRAM TRACING: CPT

## 2024-02-20 PROCEDURE — 80048 BASIC METABOLIC PNL TOTAL CA: CPT

## 2024-02-20 PROCEDURE — 85027 COMPLETE CBC AUTOMATED: CPT

## 2024-02-20 PROCEDURE — 93010 ELECTROCARDIOGRAM REPORT: CPT | Performed by: INTERNAL MEDICINE

## 2024-02-20 RX ORDER — WHEAT DEXTRIN 3 G/3.8 G
POWDER (GRAM) ORAL AS NEEDED
COMMUNITY

## 2024-02-20 NOTE — DISCHARGE INSTRUCTIONS
Take the following medications the morning of surgery with a small sip of water:      If you are on prescription narcotic pain medication to control your pain you may also take that medication the morning of surgery.    General Instructions:  Do not eat or drink anything  8 HOURS PRIOR TO SURGERY   Do not smoke, use chewing tobacco or drink alcohol the day of surgery.   Bring any papers given to you in the doctor’s office.  Wear clean comfortable clothes.  Do not wear contact lenses, false eyelashes or make-up.  Bring a case for your glasses.   Remove all piercings.  Leave jewelry and any other valuables at home.  The Pre-Admission Testing nurse will instruct you to bring medications if unable to obtain an accurate list in Pre-Admission Testing.    REPORT TO SURGERY ENTRANCE ON 2-28-24  AM          Preventing a Surgical Site Infection:  For 2 to 3 days before surgery, avoid shaving with a razor because the razor can irritate skin and make it easier to develop an infection.    Any areas of open skin can increase the risk of a post-operative wound infection by allowing bacteria to enter and travel throughout the body.  Notify your surgeon if you have any skin wounds / rashes even if it is not near the expected surgical site.  The area will need assessed to determine if surgery should be delayed until it is healed.  The night prior to surgery shower using a fresh bar of anti-bacterial soap (such as Dial) and clean washcloth.  Sleep in a clean bed with clean clothing.  Do not allow pets to sleep with you.  Shower on the morning of surgery using a fresh bar of anti-bacterial soap (such as Dial) and clean washcloth.  Dry with a clean towel and dress in clean clothing.  Ask your surgeon if you will be receiving antibiotics prior to surgery.  Make sure you, your family, and all healthcare providers clean their hands with soap and water or an alcohol based hand  before caring for you or your wound.    Day of  surgery:  Your arrival time is approximately two hours before your scheduled surgery time.  Upon arrival, a Pre-op nurse and Anesthesiologist will review your health history, obtain vital signs, and answer questions you may have.  The only belongings needed at this time will be your home medications and if applicable your C-PAP/BI-PAP machine.  A Pre-op nurse will start an IV and you may receive medication in preparation for surgery, including something to help you relax.      Please be aware that surgery does come with discomfort.  We want to make every effort to control your discomfort so please discuss any uncontrolled symptoms with your nurse.   Your doctor will most likely have prescribed pain medications.      If you are going home after surgery you will receive individualized written care instructions before being discharged.  A responsible adult must drive you to and from the hospital on the day of your surgery and ideally stay with you through the night.  Discharge prescriptions can be filled by the hospital pharmacy during regular pharmacy hours.  If you are having surgery late in the day/evening your prescription may be e-prescribed to your pharmacy.  Please verify your pharmacy hours or chose a 24 hour pharmacy to avoid not having access to your prescription because your pharmacy has closed for the day.        If you have any questions please call Pre-Admission Testing at [phone].  Deductibles and co-payments are collected on the day of service. Please be prepared to pay the required co-pay, deductible or deposit on the day of service as defined by your plan.    Call your surgeon immediately if you experience any of the following symptoms:  Sore Throat  Shortness of Breath or difficulty breathing  Cough  Chills  Body soreness or muscle pain  Headache  Fever  New loss of taste or smell  Do not arrive for your surgery ill.  Your procedure will need to be rescheduled to another time.  You will need to  call your physician before the day of surgery to avoid any unnecessary exposure to hospital staff as well as other patients.

## 2024-02-27 NOTE — H&P
[facility] History and Physical    Patient Care Team:  Sarwat Iniguez MD as PCP - General (Internal Medicine)    Chief complaint blood my urine    Subjective     Patient is a 73 y.o. male presents with history of gross hematuria CT scan showed a right lateral bladder wall tumor cystoscopic findings confirming 2 to 5 cm right supratrigonal tumor he has had a fair stream continent urine is clear PSA level 0.7 occasional frequency and urgency.       Review of Systems   Pertinent items are noted in HPI history of disc bulge with intermittent back pain    Past Medical History:   Diagnosis Date    Abnormal ECG     Bladder cancer     GERD (gastroesophageal reflux disease)     History of kidney stones     History of skin cancer     BASAL CELL REMOVED FROM NOSE, TOP OF HEAD, LT CHEEK    Hypertension     Personal history of COVID-19 2021    Pneumonia 2021     Past Surgical History:   Procedure Laterality Date    CARDIAC CATHETERIZATION N/A 2022    Procedure: Left Heart Cath;  Surgeon: MONY Francisco MD;  Location: [facility] CATH INVASIVE LOCATION;  Service: Cardiology;  Laterality: N/A;    CHOLECYSTECTOMY      COLONOSCOPY      COLONOSCOPY N/A 2023    Procedure: COLONOSCOPY TO CECUM;  Surgeon: Pollo Choe MD;  Location: [facility] ENDOSCOPY;  Service: Gastroenterology;  Laterality: N/A;  PRE- CONSTIPATION  POST- DIVERTICULOSIS    ENDOSCOPY      HEMORROIDECTOMY      SKIN BIOPSY      SKIN CANCERS     Family History   Problem Relation Age of Onset    Stroke Mother     Aneurysm Father     Malig Hyperthermia Neg Hx      Social History     Tobacco Use    Smoking status: Former     Packs/day: .5     Types: Cigarettes     Quit date:      Years since quittin.1    Smokeless tobacco: Never   Vaping Use    Vaping Use: Never used   Substance Use Topics    Alcohol use: Never    Drug use: Never       Meds:  No medications prior to admission.       Allergies:  Patient has no known  allergies.    Debilities:  None    Objective     Vital Signs     No intake or output data in the 24 hours ending 02/27/24 0717       Physical Exam:      General Appearance:  Alert, cooperative, in no acute distress   Head:  Normocephalic, without obvious abnormality, atraumatic   Eyes:  Lids and lashes normal, conjunctivae and sclerae normal, no icterus, no pallor, corneas clear   Ears:  Ears appear intact with no abnormalities noted   Throat:     Neck:  No adenopathy, supple, trachea midline, no thyromegaly, no JVD   Back:  No kyphosis present, no scoliosis present, no skin lesions, erythema or scars, no tenderness to percussion or palpation, range of motion normal   Lungs:   respirations regular, even and unlabored    Heart:  Regular rhythm and normal rate   Chest Wall:  No abnormalities observed   Abdomen:  no masses, no organomegaly, soft non-tender, non-distended, no guarding, no rebound tenderness   Rectal:     Extremities:     Pulses:     Skin:     Lymph nodes:     Neurologic:                                                                               Results Review:    I reviewed the patient's new clinical results.  Results for orders placed or performed in visit on 02/20/24   CBC (No Diff)    Specimen: Blood   Result Value Ref Range    WBC 8.94 3.40 - 10.80 10*3/mm3    RBC 4.35 4.14 - 5.80 10*6/mm3    Hemoglobin 12.6 (L) 13.0 - 17.7 g/dL    Hematocrit 37.5 37.5 - 51.0 %    MCV 86.2 79.0 - 97.0 fL    MCH 29.0 26.6 - 33.0 pg    MCHC 33.6 31.5 - 35.7 g/dL    RDW 12.6 12.3 - 15.4 %    RDW-SD 39.1 37.0 - 54.0 fl    MPV 11.0 6.0 - 12.0 fL    Platelets 228 140 - 450 10*3/mm3   Basic Metabolic Panel    Specimen: Blood   Result Value Ref Range    Glucose 99 65 - 99 mg/dL    BUN 20 8 - 23 mg/dL    Creatinine 1.33 (H) 0.76 - 1.27 mg/dL    Sodium 140 136 - 145 mmol/L    Potassium 4.1 3.5 - 5.2 mmol/L    Chloride 104 98 - 107 mmol/L    CO2 24.6 22.0 - 29.0 mmol/L    Calcium 9.0 8.6 - 10.5 mg/dL    BUN/Creatinine Ratio  15.0 7.0 - 25.0    Anion Gap 11.4 5.0 - 15.0 mmol/L    eGFR 55.7 (L) >60.0 mL/min/1.73   ECG 12 Lead   Result Value Ref Range    QT Interval 440 ms    QTC Interval 451 ms        Assessment:  Gross hematuria lateral bladder wall tumor    Left 1 mm renal stone    Plan:    Cystoscopy TUR bladder tumor R-B-O discussed with patient limited to infection bleeding perforation use of a catheter irritative bladder complaints the patient understands agrees to proceed    I discussed the patient's findings and my recommendations with patient.     Senthil Garcia MD  02/27/24  07:17 EST

## 2024-02-28 ENCOUNTER — ANESTHESIA EVENT (OUTPATIENT)
Dept: PERIOP | Facility: HOSPITAL | Age: 74
End: 2024-02-28
Payer: MEDICARE

## 2024-02-28 ENCOUNTER — ANESTHESIA (OUTPATIENT)
Dept: PERIOP | Facility: HOSPITAL | Age: 74
End: 2024-02-28
Payer: MEDICARE

## 2024-02-28 ENCOUNTER — HOSPITAL ENCOUNTER (OUTPATIENT)
Facility: HOSPITAL | Age: 74
Setting detail: HOSPITAL OUTPATIENT SURGERY
Discharge: HOME | End: 2024-02-28
Attending: UROLOGY | Admitting: UROLOGY
Payer: MEDICARE

## 2024-02-28 VITALS
OXYGEN SATURATION: 98 % | DIASTOLIC BLOOD PRESSURE: 73 MMHG | TEMPERATURE: 98.1 F | RESPIRATION RATE: 16 BRPM | SYSTOLIC BLOOD PRESSURE: 134 MMHG | HEART RATE: 60 BPM

## 2024-02-28 DIAGNOSIS — N32.89 BLADDER MASS: ICD-10-CM

## 2024-02-28 DIAGNOSIS — C67.0 MALIGNANT NEOPLASM OF TRIGONE OF URINARY BLADDER: Primary | ICD-10-CM

## 2024-02-28 PROCEDURE — 25010000002 KETOROLAC TROMETHAMINE PER 15 MG: Performed by: REGISTERED NURSE

## 2024-02-28 PROCEDURE — 25010000002 DROPERIDOL PER 5 MG: Performed by: REGISTERED NURSE

## 2024-02-28 PROCEDURE — 25010000002 ONDANSETRON PER 1 MG: Performed by: REGISTERED NURSE

## 2024-02-28 PROCEDURE — 25010000002 MITOMYCIN 40 MG RECONSTITUTED SOLUTION 1 EACH VIAL: Performed by: UROLOGY

## 2024-02-28 PROCEDURE — 25010000002 CEFAZOLIN IN DEXTROSE 2-4 GM/100ML-% SOLUTION: Performed by: UROLOGY

## 2024-02-28 PROCEDURE — 25010000002 SUGAMMADEX 200 MG/2ML SOLUTION: Performed by: REGISTERED NURSE

## 2024-02-28 PROCEDURE — 25010000002 HYDROMORPHONE PER 4 MG: Performed by: REGISTERED NURSE

## 2024-02-28 PROCEDURE — 25810000003 LACTATED RINGERS PER 1000 ML: Performed by: ANESTHESIOLOGY

## 2024-02-28 PROCEDURE — 25010000002 PROPOFOL 200 MG/20ML EMULSION: Performed by: REGISTERED NURSE

## 2024-02-28 PROCEDURE — 25010000002 FENTANYL CITRATE (PF) 50 MCG/ML SOLUTION: Performed by: REGISTERED NURSE

## 2024-02-28 PROCEDURE — 88307 TISSUE EXAM BY PATHOLOGIST: CPT | Performed by: UROLOGY

## 2024-02-28 PROCEDURE — 25010000002 LABETALOL 5 MG/ML SOLUTION: Performed by: REGISTERED NURSE

## 2024-02-28 RX ORDER — CEFAZOLIN SODIUM 2 G/100ML
2000 INJECTION, SOLUTION INTRAVENOUS ONCE
Status: COMPLETED | OUTPATIENT
Start: 2024-02-28 | End: 2024-02-28

## 2024-02-28 RX ORDER — DROPERIDOL 2.5 MG/ML
0.62 INJECTION, SOLUTION INTRAMUSCULAR; INTRAVENOUS
Status: COMPLETED | OUTPATIENT
Start: 2024-02-28 | End: 2024-02-28

## 2024-02-28 RX ORDER — LIDOCAINE HYDROCHLORIDE 10 MG/ML
0.5 INJECTION, SOLUTION INFILTRATION; PERINEURAL ONCE AS NEEDED
Status: DISCONTINUED | OUTPATIENT
Start: 2024-02-28 | End: 2024-02-28 | Stop reason: HOSPADM

## 2024-02-28 RX ORDER — LIDOCAINE HYDROCHLORIDE 20 MG/ML
JELLY TOPICAL AS NEEDED
Status: DISCONTINUED | OUTPATIENT
Start: 2024-02-28 | End: 2024-02-28 | Stop reason: HOSPADM

## 2024-02-28 RX ORDER — SODIUM CHLORIDE, SODIUM LACTATE, POTASSIUM CHLORIDE, CALCIUM CHLORIDE 600; 310; 30; 20 MG/100ML; MG/100ML; MG/100ML; MG/100ML
9 INJECTION, SOLUTION INTRAVENOUS CONTINUOUS
Status: DISCONTINUED | OUTPATIENT
Start: 2024-02-28 | End: 2024-02-28 | Stop reason: HOSPADM

## 2024-02-28 RX ORDER — PROPOFOL 10 MG/ML
INJECTION, EMULSION INTRAVENOUS AS NEEDED
Status: DISCONTINUED | OUTPATIENT
Start: 2024-02-28 | End: 2024-02-28 | Stop reason: SURG

## 2024-02-28 RX ORDER — SULFAMETHOXAZOLE AND TRIMETHOPRIM 800; 160 MG/1; MG/1
1 TABLET ORAL 2 TIMES DAILY
Qty: 10 TABLET | Refills: 0 | Status: SHIPPED | OUTPATIENT
Start: 2024-02-28 | End: 2024-03-04

## 2024-02-28 RX ORDER — SODIUM CHLORIDE 0.9 % (FLUSH) 0.9 %
3-10 SYRINGE (ML) INJECTION AS NEEDED
Status: DISCONTINUED | OUTPATIENT
Start: 2024-02-28 | End: 2024-02-28 | Stop reason: HOSPADM

## 2024-02-28 RX ORDER — MIDAZOLAM HYDROCHLORIDE 1 MG/ML
0.5 INJECTION INTRAMUSCULAR; INTRAVENOUS
Status: DISCONTINUED | OUTPATIENT
Start: 2024-02-28 | End: 2024-02-28 | Stop reason: HOSPADM

## 2024-02-28 RX ORDER — PROMETHAZINE HYDROCHLORIDE 25 MG/1
25 SUPPOSITORY RECTAL ONCE AS NEEDED
Status: DISCONTINUED | OUTPATIENT
Start: 2024-02-28 | End: 2024-02-28 | Stop reason: HOSPADM

## 2024-02-28 RX ORDER — PHENAZOPYRIDINE HYDROCHLORIDE 100 MG/1
100 TABLET, FILM COATED ORAL 3 TIMES DAILY PRN
Qty: 21 TABLET | Refills: 0 | Status: SHIPPED | OUTPATIENT
Start: 2024-02-28

## 2024-02-28 RX ORDER — HYDROMORPHONE HYDROCHLORIDE 1 MG/ML
0.25 INJECTION, SOLUTION INTRAMUSCULAR; INTRAVENOUS; SUBCUTANEOUS
Status: DISCONTINUED | OUTPATIENT
Start: 2024-02-28 | End: 2024-02-28 | Stop reason: HOSPADM

## 2024-02-28 RX ORDER — PROMETHAZINE HYDROCHLORIDE 25 MG/1
25 TABLET ORAL ONCE AS NEEDED
Status: DISCONTINUED | OUTPATIENT
Start: 2024-02-28 | End: 2024-02-28 | Stop reason: HOSPADM

## 2024-02-28 RX ORDER — LABETALOL HYDROCHLORIDE 5 MG/ML
5 INJECTION, SOLUTION INTRAVENOUS
Status: DISCONTINUED | OUTPATIENT
Start: 2024-02-28 | End: 2024-02-28 | Stop reason: HOSPADM

## 2024-02-28 RX ORDER — FENTANYL CITRATE 50 UG/ML
50 INJECTION, SOLUTION INTRAMUSCULAR; INTRAVENOUS ONCE AS NEEDED
Status: DISCONTINUED | OUTPATIENT
Start: 2024-02-28 | End: 2024-02-28 | Stop reason: HOSPADM

## 2024-02-28 RX ORDER — HYDROCODONE BITARTRATE AND ACETAMINOPHEN 7.5; 325 MG/1; MG/1
1 TABLET ORAL EVERY 4 HOURS PRN
Status: DISCONTINUED | OUTPATIENT
Start: 2024-02-28 | End: 2024-02-28 | Stop reason: HOSPADM

## 2024-02-28 RX ORDER — FENTANYL CITRATE 50 UG/ML
25 INJECTION, SOLUTION INTRAMUSCULAR; INTRAVENOUS
Status: DISCONTINUED | OUTPATIENT
Start: 2024-02-28 | End: 2024-02-28 | Stop reason: HOSPADM

## 2024-02-28 RX ORDER — HYDROCODONE BITARTRATE AND ACETAMINOPHEN 5; 325 MG/1; MG/1
1 TABLET ORAL ONCE AS NEEDED
Status: DISCONTINUED | OUTPATIENT
Start: 2024-02-28 | End: 2024-02-28 | Stop reason: HOSPADM

## 2024-02-28 RX ORDER — ROCURONIUM BROMIDE 10 MG/ML
INJECTION, SOLUTION INTRAVENOUS AS NEEDED
Status: DISCONTINUED | OUTPATIENT
Start: 2024-02-28 | End: 2024-02-28 | Stop reason: SURG

## 2024-02-28 RX ORDER — LIDOCAINE HYDROCHLORIDE 20 MG/ML
INJECTION, SOLUTION INFILTRATION; PERINEURAL AS NEEDED
Status: DISCONTINUED | OUTPATIENT
Start: 2024-02-28 | End: 2024-02-28 | Stop reason: SURG

## 2024-02-28 RX ORDER — IPRATROPIUM BROMIDE AND ALBUTEROL SULFATE 2.5; .5 MG/3ML; MG/3ML
3 SOLUTION RESPIRATORY (INHALATION) ONCE AS NEEDED
Status: DISCONTINUED | OUTPATIENT
Start: 2024-02-28 | End: 2024-02-28 | Stop reason: HOSPADM

## 2024-02-28 RX ORDER — NALOXONE HCL 0.4 MG/ML
0.2 VIAL (ML) INJECTION AS NEEDED
Status: DISCONTINUED | OUTPATIENT
Start: 2024-02-28 | End: 2024-02-28 | Stop reason: HOSPADM

## 2024-02-28 RX ORDER — HYDROCODONE BITARTRATE AND ACETAMINOPHEN 7.5; 325 MG/1; MG/1
1 TABLET ORAL EVERY 6 HOURS PRN
Qty: 20 TABLET | Refills: 0 | Status: SHIPPED | OUTPATIENT
Start: 2024-02-28

## 2024-02-28 RX ORDER — SODIUM CHLORIDE 0.9 % (FLUSH) 0.9 %
3 SYRINGE (ML) INJECTION EVERY 12 HOURS SCHEDULED
Status: DISCONTINUED | OUTPATIENT
Start: 2024-02-28 | End: 2024-02-28 | Stop reason: HOSPADM

## 2024-02-28 RX ORDER — DIPHENHYDRAMINE HYDROCHLORIDE 50 MG/ML
12.5 INJECTION INTRAMUSCULAR; INTRAVENOUS
Status: DISCONTINUED | OUTPATIENT
Start: 2024-02-28 | End: 2024-02-28 | Stop reason: HOSPADM

## 2024-02-28 RX ORDER — ONDANSETRON 2 MG/ML
INJECTION INTRAMUSCULAR; INTRAVENOUS AS NEEDED
Status: DISCONTINUED | OUTPATIENT
Start: 2024-02-28 | End: 2024-02-28 | Stop reason: SURG

## 2024-02-28 RX ORDER — FAMOTIDINE 10 MG/ML
20 INJECTION, SOLUTION INTRAVENOUS ONCE
Status: COMPLETED | OUTPATIENT
Start: 2024-02-28 | End: 2024-02-28

## 2024-02-28 RX ORDER — HYDRALAZINE HYDROCHLORIDE 20 MG/ML
5 INJECTION INTRAMUSCULAR; INTRAVENOUS
Status: DISCONTINUED | OUTPATIENT
Start: 2024-02-28 | End: 2024-02-28 | Stop reason: HOSPADM

## 2024-02-28 RX ORDER — LABETALOL HYDROCHLORIDE 5 MG/ML
INJECTION, SOLUTION INTRAVENOUS AS NEEDED
Status: DISCONTINUED | OUTPATIENT
Start: 2024-02-28 | End: 2024-02-28 | Stop reason: SURG

## 2024-02-28 RX ORDER — EPHEDRINE SULFATE 50 MG/ML
5 INJECTION, SOLUTION INTRAVENOUS ONCE AS NEEDED
Status: DISCONTINUED | OUTPATIENT
Start: 2024-02-28 | End: 2024-02-28 | Stop reason: HOSPADM

## 2024-02-28 RX ORDER — FENTANYL CITRATE 50 UG/ML
INJECTION, SOLUTION INTRAMUSCULAR; INTRAVENOUS AS NEEDED
Status: DISCONTINUED | OUTPATIENT
Start: 2024-02-28 | End: 2024-02-28 | Stop reason: SURG

## 2024-02-28 RX ORDER — KETOROLAC TROMETHAMINE 30 MG/ML
INJECTION, SOLUTION INTRAMUSCULAR; INTRAVENOUS AS NEEDED
Status: DISCONTINUED | OUTPATIENT
Start: 2024-02-28 | End: 2024-02-28 | Stop reason: SURG

## 2024-02-28 RX ORDER — FLUMAZENIL 0.1 MG/ML
0.2 INJECTION INTRAVENOUS AS NEEDED
Status: DISCONTINUED | OUTPATIENT
Start: 2024-02-28 | End: 2024-02-28 | Stop reason: HOSPADM

## 2024-02-28 RX ORDER — ONDANSETRON 4 MG/1
4 TABLET, ORALLY DISINTEGRATING ORAL EVERY 8 HOURS PRN
Qty: 10 TABLET | Refills: 1 | Status: SHIPPED | OUTPATIENT
Start: 2024-02-28

## 2024-02-28 RX ORDER — ONDANSETRON 2 MG/ML
4 INJECTION INTRAMUSCULAR; INTRAVENOUS ONCE AS NEEDED
Status: COMPLETED | OUTPATIENT
Start: 2024-02-28 | End: 2024-02-28

## 2024-02-28 RX ADMIN — ONDANSETRON 4 MG: 2 INJECTION INTRAMUSCULAR; INTRAVENOUS at 10:27

## 2024-02-28 RX ADMIN — LABETALOL HYDROCHLORIDE 10 MG: 5 INJECTION, SOLUTION INTRAVENOUS at 09:35

## 2024-02-28 RX ADMIN — FENTANYL CITRATE 25 MCG: 50 INJECTION, SOLUTION INTRAMUSCULAR; INTRAVENOUS at 11:46

## 2024-02-28 RX ADMIN — FAMOTIDINE 20 MG: 10 INJECTION INTRAVENOUS at 08:22

## 2024-02-28 RX ADMIN — LIDOCAINE HYDROCHLORIDE 100 MG: 20 INJECTION, SOLUTION INFILTRATION; PERINEURAL at 09:11

## 2024-02-28 RX ADMIN — DROPERIDOL 0.62 MG: 2.5 INJECTION, SOLUTION INTRAMUSCULAR; INTRAVENOUS at 12:20

## 2024-02-28 RX ADMIN — ROCURONIUM BROMIDE 50 MG: 10 INJECTION, SOLUTION INTRAVENOUS at 09:13

## 2024-02-28 RX ADMIN — HYDROMORPHONE HYDROCHLORIDE 0.25 MG: 1 INJECTION, SOLUTION INTRAMUSCULAR; INTRAVENOUS; SUBCUTANEOUS at 09:55

## 2024-02-28 RX ADMIN — FENTANYL CITRATE 25 MCG: 50 INJECTION, SOLUTION INTRAMUSCULAR; INTRAVENOUS at 11:51

## 2024-02-28 RX ADMIN — FENTANYL CITRATE 25 MCG: 50 INJECTION, SOLUTION INTRAMUSCULAR; INTRAVENOUS at 09:54

## 2024-02-28 RX ADMIN — DROPERIDOL 0.62 MG: 2.5 INJECTION, SOLUTION INTRAMUSCULAR; INTRAVENOUS at 10:29

## 2024-02-28 RX ADMIN — HYDROMORPHONE HYDROCHLORIDE 0.25 MG: 1 INJECTION, SOLUTION INTRAMUSCULAR; INTRAVENOUS; SUBCUTANEOUS at 10:00

## 2024-02-28 RX ADMIN — ONDANSETRON 4 MG: 2 INJECTION INTRAMUSCULAR; INTRAVENOUS at 09:29

## 2024-02-28 RX ADMIN — KETOROLAC TROMETHAMINE 30 MG: 30 INJECTION, SOLUTION INTRAMUSCULAR; INTRAVENOUS at 09:39

## 2024-02-28 RX ADMIN — MITOMYCIN 40 MG: 40 INJECTION, POWDER, LYOPHILIZED, FOR SOLUTION INTRAVENOUS at 10:50

## 2024-02-28 RX ADMIN — SUGAMMADEX 200 MG: 100 INJECTION, SOLUTION INTRAVENOUS at 09:37

## 2024-02-28 RX ADMIN — FENTANYL CITRATE 50 MCG: 50 INJECTION, SOLUTION INTRAMUSCULAR; INTRAVENOUS at 09:29

## 2024-02-28 RX ADMIN — SODIUM CHLORIDE, POTASSIUM CHLORIDE, SODIUM LACTATE AND CALCIUM CHLORIDE 9 ML/HR: 600; 310; 30; 20 INJECTION, SOLUTION INTRAVENOUS at 08:22

## 2024-02-28 RX ADMIN — CEFAZOLIN SODIUM 2000 MG: 2 INJECTION, SOLUTION INTRAVENOUS at 08:59

## 2024-02-28 RX ADMIN — FENTANYL CITRATE 25 MCG: 50 INJECTION, SOLUTION INTRAMUSCULAR; INTRAVENOUS at 09:59

## 2024-02-28 RX ADMIN — HYDROCODONE BITARTRATE AND ACETAMINOPHEN 1 TABLET: 7.5; 325 TABLET ORAL at 10:28

## 2024-02-28 RX ADMIN — PROPOFOL 150 MG: 10 INJECTION, EMULSION INTRAVENOUS at 09:11

## 2024-02-28 NOTE — ANESTHESIA PROCEDURE NOTES
Airway  Urgency: elective    Date/Time: 2/28/2024 9:15 AM  Airway not difficult    General Information and Staff    Patient location during procedure: OR  Anesthesiologist: Pili Villagran MD  CRNA/CAA: Hari Pabon CRNA    Indications and Patient Condition  Indications for airway management: airway protection    Preoxygenated: yes  MILS not maintained throughout  Mask difficulty assessment: 1 - vent by mask    Final Airway Details  Final airway type: endotracheal airway      Successful airway: ETT  Cuffed: yes   Successful intubation technique: direct laryngoscopy  Endotracheal tube insertion site: oral  Blade: Nuria  Blade size: 4  ETT size (mm): 7.5  Cormack-Lehane Classification: grade I - full view of glottis  Placement verified by: chest auscultation and capnometry   Cuff volume (mL): 10  Measured from: teeth  ETT/EBT  to teeth (cm): 22  Number of attempts at approach: 1  Assessment: lips, teeth, and gum same as pre-op and atraumatic intubation

## 2024-02-28 NOTE — BRIEF OP NOTE
CYSTOSCOPY TRANSURETHRAL RESECTION OF BLADDER TUMOR  Progress Note    Martir Porter  2/28/2024    Pre-op Diagnosis:   Gross hematuria right trigonal bladder mass consistent with transitional cell carcinoma BPH       Post-Op Diagnosis Codes:  Same    Procedure/CPT® Codes:        Procedure(s):  CYSTOSCOPY TRANSURETHRAL RESECTION OF BLADDER TUMOR medium 4 cm and Mitomycin-C 40 mg instillation postop              Surgeon(s):  Senthil Garcia MD    Anesthesia: General    Staff:   Circulator: Bobby An RN  Scrub Person: Juan Ramon Salvador RN         Estimated Blood Loss: none    Urine Voided: * No values recorded between 2/28/2024  9:05 AM and 2/28/2024  9:45 AM *    Specimens:                Specimens       ID Source Type Tests Collected By Collected At Frozen?    A Urinary Bladder Tissue TISSUE PATHOLOGY EXAM   Senthil Garcia MD 2/28/24 0849     Description: right trigone                  Drains:   Urethral Catheter Latex 18 Fr. (Active)       Findings: As above appearance superficial        Complications: None          Senthil Garcia MD     Date: 2/28/2024  Time: 09:48 EST

## 2024-02-28 NOTE — ANESTHESIA PREPROCEDURE EVALUATION
Anesthesia Evaluation     Patient summary reviewed and Nursing notes reviewed   no history of anesthetic complications:   NPO Solid Status: > 8 hours  NPO Liquid Status: > 2 hours           Airway   Mallampati: II  Dental - normal exam     Pulmonary    (+) pneumonia , a smoker Former, COPD,  Cardiovascular   Exercise tolerance: good (4-7 METS)    ECG reviewed    (+) hypertension, angina    ROS comment: Sinus or ectopic atrial rhythm  No change from previous tracing  Electronically Signed By: Robert ToneyMUNIR) ([facility]) 20-Feb-2024 20:29:20    Heart cath 8/11/22:  Conclusions:  Normal coronary arteries  Normal left ventricular function  Noncardiac chest pain      Neuro/Psych- negative ROS  GI/Hepatic/Renal/Endo    (+) obesity, GERD    Musculoskeletal (-) negative ROS    Abdominal    Substance History - negative use     OB/GYN negative ob/gyn ROS         Other      history of cancer                  Anesthesia Plan    ASA 3     general     (/100 (BP Location: Right arm, Patient Position: Lying)   Pulse 66   Temp 36.6 °C (97.9 °F) (Oral)   Resp 16   SpO2 99%     I have reviewed the patient's history with the patient and the chart, including all pertinent laboratory results and imaging. I have explained the risks of anesthesia including but not limited to dental damage, corneal abrasion, nerve injury, MI, stroke, and death.    )  intravenous induction     Anesthetic plan, risks, benefits, and alternatives have been provided, discussed and informed consent has been obtained with: patient.    CODE STATUS:

## 2024-02-28 NOTE — DISCHARGE INSTRUCTIONS
Chemotherapy Bladder Instillation  Post Op Instructions    Medication was injected into your bladder to treat bladder cancer.  There are some special precautions you will need to take for the next few days to obtain the best results from this treatment.      Go home, rest and take it easy today.  Do not make any important legal decisions or sign legal papers today.  No driving for 24 hours.  A responsible adult should remain with you for the next 24 hours.  You may resume your regular diet; however, begin with bland foods to avoid any post-operative nausea and vomiting.    Drink extra fluids today.   You may notice some burning when you empty your bladder. This is to be expected and should lessen over time.    Always wash your hands after urinating.  For the next 48 hours, wash any skin area that comes into contact with your urine.  Make sure you use soap and water to cleanse the skin.  If any clothing comes into contact with the urine, please wash these items separately from your regular laundry.  Use gloves to handle soiled items.   No sexual intercourse for the next 48 hours.      Call the doctor for any of the following:  Chills and fever>101 degrees  Inability to urinate  Increased burning with urination  Blood in your urine.    Any other concerns

## 2024-02-28 NOTE — OP NOTE
Preop diagnosis gross hematuria right hemitrigone bladder tumor    Postoperative diagnosis same superficial appearing right hemitrigone bladder tumor obscuring right ureteral orifice BPH mild bladder trabeculations    Operative procedure cystoscopy TUR medium bladder tumor 4 cm Hardwick catheter insertion and order for instillation of Mitomycin-C postoperatively 40 mg    Surgeon Jose    Anesthesia General    Procedure note 73-year-old gentleman presenting with gross hematuria CT scan findings of possible bladder mass confirmed endoscopically in the office to undergo the above-mentioned procedure I discussed this procedure with the and his daughters and wife they understand agreed to proceed    Site was marked timeout was taken COVID precautions allergies none known antibiotics were given after adequate general anesthesia was placed very carefully modified dorsolithotomy position all pressure points relieved prepped and draped sterile fashion genitalia 2% intraurethral lidocaine jelly was administered the patient was mild meatal stenosis that was sounded to approximately 30 Czech a a 26 Czech resectoscope sheath was placed easily into the bladder the tumor was resected superficial muscle obtained hemostasis checked and verified this did obscure the ureteral orifice however there was clear E flux coming from the partially resected orifice the left side was uninvolved moderate trabeculations were seen no other tumors were seen as well after completion of this curettings were irrigated free and sent to pathology reinspection revealed no signs of any active bleeding with the patient normotensive Hardwick catheter was inserted with clear urine on return    Rectal examination anesthesia revealed no rectal masses and prostates about 25 to 30 cc smooth symmetric and benign palpably    I discussed the findings with the patient's daughter nurse practitioner findings and anticipation of placement of Mitomycin-C prescriptions and  follow-up instruction sheet phone numbers given concerns to call us office will call them for follow-up appointment for 2 weeks

## 2024-02-28 NOTE — ANESTHESIA POSTPROCEDURE EVALUATION
Patient: Martir Porter    Procedure Summary       Date: 02/28/24 Room / Location: [facility] OR 01 / [facility] MAIN OR    Anesthesia Start: 0904 Anesthesia Stop: 0953    Procedure: CYSTOSCOPY TRANSURETHRAL RESECTION OF BLADDER TUMOR Diagnosis:     Surgeons: Senthil Garcia MD Provider: Pili Villagran MD    Anesthesia Type: general ASA Status: 3            Anesthesia Type: general    Vitals  Vitals Value Taken Time   BP 99/82 02/28/24 1130   Temp 36.7 °C (98.1 °F) 02/28/24 0950   Pulse 78 02/28/24 1221   Resp 16 02/28/24 1130   SpO2 98 % 02/28/24 1221   Vitals shown include unfiled device data.        Post Anesthesia Care and Evaluation    Patient location during evaluation: bedside  Patient participation: complete - patient participated  Level of consciousness: awake  Pain management: adequate    Airway patency: patent  Anesthetic complications: No anesthetic complications    Cardiovascular status: acceptable  Respiratory status: acceptable  Hydration status: acceptable

## 2024-03-04 LAB
LAB AP CASE REPORT: NORMAL
LAB AP SYNOPTIC CHECKLIST: NORMAL
PATH REPORT.FINAL DX SPEC: NORMAL
PATH REPORT.GROSS SPEC: NORMAL

## 2024-06-03 ENCOUNTER — OFFICE VISIT (OUTPATIENT)
Dept: FAMILY MEDICINE CLINIC | Age: 74
End: 2024-06-03
Payer: MEDICARE

## 2024-06-03 VITALS
WEIGHT: 226.8 LBS | DIASTOLIC BLOOD PRESSURE: 70 MMHG | OXYGEN SATURATION: 97 % | TEMPERATURE: 98.2 F | HEART RATE: 60 BPM | SYSTOLIC BLOOD PRESSURE: 134 MMHG | BODY MASS INDEX: 30.72 KG/M2 | HEIGHT: 72 IN

## 2024-06-03 DIAGNOSIS — C67.0 MALIGNANT NEOPLASM OF TRIGONE OF URINARY BLADDER: Chronic | ICD-10-CM

## 2024-06-03 DIAGNOSIS — G57.91 NEUROPATHY OF RIGHT FOOT: ICD-10-CM

## 2024-06-03 DIAGNOSIS — K59.04 CHRONIC IDIOPATHIC CONSTIPATION: ICD-10-CM

## 2024-06-03 DIAGNOSIS — Z79.899 HIGH RISK MEDICATION USE: Primary | ICD-10-CM

## 2024-06-03 PROBLEM — M53.9 MULTILEVEL DEGENERATIVE DISC DISEASE: Status: ACTIVE | Noted: 2023-05-23

## 2024-06-03 PROBLEM — D89.831 CYTOKINE RELEASE SYNDROME, GRADE 1: Status: RESOLVED | Noted: 2021-02-10 | Resolved: 2024-06-03

## 2024-06-03 PROBLEM — R04.2 HEMOPTYSIS: Status: RESOLVED | Noted: 2021-02-09 | Resolved: 2024-06-03

## 2024-06-03 PROBLEM — J12.82 PNEUMONIA DUE TO COVID-19 VIRUS: Status: RESOLVED | Noted: 2021-02-08 | Resolved: 2024-06-03

## 2024-06-03 PROBLEM — E78.2 MIXED HYPERLIPIDEMIA: Status: ACTIVE | Noted: 2023-05-23

## 2024-06-03 PROBLEM — K85.90 PANCREATITIS: Status: ACTIVE | Noted: 2024-06-03

## 2024-06-03 PROBLEM — R05.9 COUGH: Status: RESOLVED | Noted: 2022-03-25 | Resolved: 2024-06-03

## 2024-06-03 PROBLEM — K85.90 PANCREATITIS: Status: RESOLVED | Noted: 2024-06-03 | Resolved: 2024-06-03

## 2024-06-03 PROBLEM — J44.9 CHRONIC OBSTRUCTIVE LUNG DISEASE: Status: RESOLVED | Noted: 2021-02-15 | Resolved: 2024-06-03

## 2024-06-03 PROBLEM — U07.1 PNEUMONIA DUE TO COVID-19 VIRUS: Status: RESOLVED | Noted: 2021-02-08 | Resolved: 2024-06-03

## 2024-06-03 PROBLEM — J81.0 ACUTE PULMONARY EDEMA: Status: RESOLVED | Noted: 2021-02-10 | Resolved: 2024-06-03

## 2024-06-03 LAB
AMPHET+METHAMPHET UR QL: NEGATIVE
AMPHETAMINE INTERNAL CONTROL: NORMAL
AMPHETAMINES UR QL: NEGATIVE
BARBITURATE INTERNAL CONTROL: NORMAL
BARBITURATES UR QL SCN: NEGATIVE
BENZODIAZ UR QL SCN: NEGATIVE
BENZODIAZEPINE INTERNAL CONTROL: NORMAL
BUPRENORPHINE INTERNAL CONTROL: NORMAL
BUPRENORPHINE SERPL-MCNC: NEGATIVE NG/ML
CANNABINOIDS SERPL QL: NEGATIVE
COCAINE INTERNAL CONTROL: NORMAL
COCAINE UR QL: NEGATIVE
EXPIRATION DATE: NORMAL
Lab: NORMAL
MDMA (ECSTASY) INTERNAL CONTROL: NORMAL
MDMA UR QL SCN: NEGATIVE
METHADONE INTERNAL CONTROL: NORMAL
METHADONE UR QL SCN: NEGATIVE
METHAMPHETAMINE INTERNAL CONTROL: NORMAL
MORPHINE INTERNAL CONTROL: NORMAL
MORPHINE/OPIATES SCREEN, URINE: NEGATIVE
OXYCODONE INTERNAL CONTROL: NORMAL
OXYCODONE UR QL SCN: NEGATIVE
PCP UR QL SCN: NEGATIVE
PHENCYCLIDINE INTERNAL CONTROL: NORMAL
THC INTERNAL CONTROL: NORMAL

## 2024-06-03 PROCEDURE — 3075F SYST BP GE 130 - 139MM HG: CPT | Performed by: NURSE PRACTITIONER

## 2024-06-03 PROCEDURE — 3078F DIAST BP <80 MM HG: CPT | Performed by: NURSE PRACTITIONER

## 2024-06-03 PROCEDURE — 1160F RVW MEDS BY RX/DR IN RCRD: CPT | Performed by: NURSE PRACTITIONER

## 2024-06-03 PROCEDURE — 99213 OFFICE O/P EST LOW 20 MIN: CPT | Performed by: NURSE PRACTITIONER

## 2024-06-03 PROCEDURE — 1159F MED LIST DOCD IN RCRD: CPT | Performed by: NURSE PRACTITIONER

## 2024-06-03 RX ORDER — GABAPENTIN 100 MG/1
100 CAPSULE ORAL NIGHTLY
Qty: 90 CAPSULE | Refills: 1 | Status: SHIPPED | OUTPATIENT
Start: 2024-06-03

## 2024-06-03 RX ORDER — LACTULOSE 10 G/15ML
20 SOLUTION ORAL 2 TIMES DAILY PRN
Qty: 473 ML | Refills: 1 | Status: SHIPPED | OUTPATIENT
Start: 2024-06-03

## 2024-06-03 RX ORDER — GABAPENTIN 100 MG/1
100 CAPSULE ORAL NIGHTLY
COMMUNITY
Start: 2024-04-25 | End: 2024-06-03 | Stop reason: SDUPTHER

## 2024-06-03 NOTE — PROGRESS NOTES
Chief Complaint  Establish Care    Subjective        Martir Porter presents to [facility] today to reestablish care with provider for acute care issues.  Continues to see Dr. Sarwat Iniguez in [facility] as his primary care provider    Currently being seen by Dr. Garcia, urology, for bladder cancer.  Has cystoscopy every 3 months, for the next 2 years.  First cystoscopy was done February 28 2024, tumor removed and bladder irrigation with chemo performed.  Next cystoscopy scheduled for 6/5/2024.    Regarding hypertension, is currently taking amlodipine 5 mg daily, atenolol 50 mg daily, and losartan 100 mg daily, blood pressure controlled, 130-140 over 80s.    Chronic intermittent problems with constipation, last colonoscopy by Dr. Pollo Choe, GI, 8/23/2023 showed diverticulosis otherwise normal.      Current Outpatient Medications:     amLODIPine (NORVASC) 5 MG tablet, Take 1 tablet by mouth Daily., Disp: , Rfl:     atenolol (TENORMIN) 50 MG tablet, Take 1 tablet by mouth Every Evening., Disp: , Rfl:     gabapentin (NEURONTIN) 100 MG capsule, Take 1 capsule by mouth Every Night., Disp: 90 capsule, Rfl: 1    losartan (COZAAR) 100 MG tablet, Take 1 tablet by mouth Daily., Disp: , Rfl:     nitroglycerin (NITROSTAT) 0.4 MG SL tablet, Place 1 tablet under the tongue Every 5 (Five) Minutes As Needed., Disp: , Rfl:     omeprazole (priLOSEC) 40 MG capsule, 1 capsule Daily., Disp: , Rfl:     ondansetron ODT (ZOFRAN-ODT) 4 MG disintegrating tablet, Place 1 tablet on the tongue Every 8 (Eight) Hours As Needed for Nausea., Disp: 10 tablet, Rfl: 1    phenazopyridine (PYRIDIUM) 100 MG tablet, Take 1 tablet by mouth 3 (Three) Times a Day As Needed for Bladder Spasms., Disp: 21 tablet, Rfl: 0    traMADol (ULTRAM) 50 MG tablet, Take 1-2 tablets by mouth Every 6 (Six) Hours As Needed., Disp: , Rfl:     vitamin C (ASCORBIC ACID) 500 MG tablet, Take 1 tablet by mouth Daily., Disp: ,  "Rfl:     vitamin D3 125 MCG (5000 UT) capsule capsule, Take 1 capsule by mouth Daily., Disp: , Rfl:     Wheat Dextrin (Benefiber) powder, Take  by mouth As Needed., Disp: , Rfl:     lactulose (CHRONULAC) 10 GM/15ML solution, Take 30 mL by mouth 2 (Two) Times a Day As Needed (constipation)., Disp: 473 mL, Rfl: 1  Medications Discontinued During This Encounter   Medication Reason    HYDROcodone-acetaminophen (NORCO) 7.5-325 MG per tablet *Therapy completed    B Complex Vitamins (vitamin b complex) capsule capsule *Therapy completed    gabapentin (NEURONTIN) 100 MG capsule Reorder         Allergies:  Patient has no known allergies.      Objective   Vital Signs:   Vitals:    06/03/24 1244 06/03/24 1253   BP: (!) 143/112 134/70   BP Location: Right arm Left arm   Patient Position: Sitting Sitting   Cuff Size: Large Adult Large Adult   Pulse: 119 60   Temp: 98.2 °F (36.8 °C)    TempSrc: Oral    SpO2: 97%    Weight: 103 kg (226 lb 12.8 oz)    Height: 181.6 cm (71.5\")      Body mass index is 31.19 kg/m².  BMI is >= 30 and <35. (Class 1 Obesity). The following options were offered after discussion;: exercise counseling/recommendations and nutrition counseling/recommendations        Physical Exam  Constitutional:       Appearance: Normal appearance.   Neck:      Vascular: No carotid bruit.   Cardiovascular:      Rate and Rhythm: Normal rate and regular rhythm.      Heart sounds: Normal heart sounds.   Pulmonary:      Effort: Pulmonary effort is normal.      Breath sounds: Normal breath sounds.   Musculoskeletal:         General: Normal range of motion.   Skin:     General: Skin is warm and dry.   Neurological:      General: No focal deficit present.      Mental Status: He is alert.   Psychiatric:         Mood and Affect: Mood normal.         Behavior: Behavior normal.           Lab Results   Component Value Date    GLUCOSE 99 02/20/2024    BUN 20 02/20/2024    CREATININE 1.33 (H) 02/20/2024    EGFRIFNONA 83 02/10/2021    BCR " 15.0 02/20/2024    K 4.1 02/20/2024    CO2 24.6 02/20/2024    CALCIUM 9.0 02/20/2024    ALBUMIN 3.30 (L) 02/10/2021    LABIL2 1.3 (L) 01/26/2019    AST 49 (H) 02/10/2021    ALT 34 02/10/2021           Lab Results   Component Value Date    WBC 8.94 02/20/2024    HGB 12.6 (L) 02/20/2024    HCT 37.5 02/20/2024    MCV 86.2 02/20/2024     02/20/2024       Procedures         Diagnoses and all orders for this visit:    1. High risk medication use (Primary)  Comments:  Consent, urine drug screen today  Orders:  -     Urine Drug Screen - Urine, Clean Catch; Future  -     POC Medline 12 Panel Urine Drug Screen    2. Malignant neoplasm of trigone of urinary bladder  Assessment & Plan:  Being followed by Dr. Chay Garcia urology, having a cystoscopy every 3 months for the next 2 years.  First cystoscopy was 2/28/2024, treated with chemo instilled inside the bladder after surgery.  Next cystoscopy 6/5/2024      3. Neuropathy of right foot  -     gabapentin (NEURONTIN) 100 MG capsule; Take 1 capsule by mouth Every Night.  Dispense: 90 capsule; Refill: 1    4. Chronic idiopathic constipation  -     lactulose (CHRONULAC) 10 GM/15ML solution; Take 30 mL by mouth 2 (Two) Times a Day As Needed (constipation).  Dispense: 473 mL; Refill: 1            Follow Up  Return in about 6 months (around 12/3/2024), or if symptoms worsen or fail to improve.  Patient was given instructions and counseling regarding his condition or for health maintenance advice. Please see specific information pulled into the AVS if appropriate.           Angle Tate, APRN  06/03/2024    Please note that portions of this document were completed using a voice recognition program.

## 2024-06-03 NOTE — ASSESSMENT & PLAN NOTE
Being followed by Dr. Chay Garcia urology, having a cystoscopy every 3 months for the next 2 years.  First cystoscopy was 2/28/2024, treated with chemo instilled inside the bladder after surgery.  Next cystoscopy 6/5/2024

## 2024-10-31 ENCOUNTER — TELEPHONE (OUTPATIENT)
Dept: FAMILY MEDICINE CLINIC | Age: 74
End: 2024-10-31
Payer: COMMERCIAL

## 2024-10-31 NOTE — TELEPHONE ENCOUNTER
----- Message from Juana Tate sent at 10/31/2024 12:35 PM EDT -----  Call and speak with Dr JOSÉ MIGUEL Iniguez office. Patient has been taking Gabapentin 100mg q hs for burning sensation in his feet , big toe for past couple months with improvement , this  pain has been present for over 2 years and keeps him up at night a lot of the time. Was given trial dose Gabapentin 100mg q hs to see if it helped his symptoms and it has , is helping with  pain /burning and helping with sleep. However, he needs to be getting all this meds from his PCP Dr Iniguez. See if he can discuss with patient and take over script or recommend different treatment.

## 2024-10-31 NOTE — TELEPHONE ENCOUNTER
Sadia with Dr Hira gipson ret'd call, she states she will call pt with appt and have pt discuss with MD.

## 2024-11-04 DIAGNOSIS — G57.91 NEUROPATHY OF RIGHT FOOT: ICD-10-CM

## 2024-11-04 DIAGNOSIS — G57.91 NEUROPATHY OF RIGHT FOOT: Primary | ICD-10-CM

## 2024-11-04 RX ORDER — GABAPENTIN 100 MG/1
200 CAPSULE ORAL NIGHTLY
Qty: 180 CAPSULE | Refills: 0 | Status: SHIPPED | OUTPATIENT
Start: 2024-11-04 | End: 2024-11-04 | Stop reason: ALTCHOICE

## 2024-11-04 RX ORDER — GABAPENTIN 300 MG/1
300 CAPSULE ORAL NIGHTLY
Qty: 30 CAPSULE | Refills: 1 | Status: SHIPPED | OUTPATIENT
Start: 2024-11-04

## 2024-11-06 DIAGNOSIS — N41.9 PROSTATE INFECTION: Primary | ICD-10-CM

## 2024-11-06 RX ORDER — CIPROFLOXACIN 500 MG/1
500 TABLET, FILM COATED ORAL 2 TIMES DAILY
Qty: 20 TABLET | Refills: 0 | Status: SHIPPED | OUTPATIENT
Start: 2024-11-06

## 2025-03-24 DIAGNOSIS — M53.9 MULTILEVEL DEGENERATIVE DISC DISEASE: Primary | ICD-10-CM

## 2025-03-27 ENCOUNTER — DOCUMENTATION (OUTPATIENT)
Dept: FAMILY MEDICINE CLINIC | Age: 75
End: 2025-03-27
Payer: COMMERCIAL

## 2025-03-27 DIAGNOSIS — J69.0 ASPIRATION PNEUMONIA DUE TO REGURGITATED FOOD, UNSPECIFIED LATERALITY, UNSPECIFIED PART OF LUNG: Primary | ICD-10-CM

## 2025-03-27 DIAGNOSIS — M53.9 MULTILEVEL DEGENERATIVE DISC DISEASE: Primary | ICD-10-CM

## 2025-03-27 RX ORDER — AZITHROMYCIN 250 MG/1
TABLET, FILM COATED ORAL
Qty: 6 TABLET | Refills: 0 | Status: SHIPPED | OUTPATIENT
Start: 2025-03-27

## 2025-03-27 RX ORDER — PREDNISONE 10 MG/1
TABLET ORAL
Qty: 17 TABLET | Refills: 0 | Status: SHIPPED | OUTPATIENT
Start: 2025-03-27

## 2025-03-31 ENCOUNTER — OFFICE VISIT (OUTPATIENT)
Dept: FAMILY MEDICINE CLINIC | Age: 75
End: 2025-03-31
Payer: MEDICARE

## 2025-03-31 ENCOUNTER — HOSPITAL ENCOUNTER (OUTPATIENT)
Dept: GENERAL RADIOLOGY | Facility: HOSPITAL | Age: 75
Discharge: HOME | End: 2025-03-31
Payer: MEDICARE

## 2025-03-31 ENCOUNTER — LAB (OUTPATIENT)
Dept: LAB | Facility: HOSPITAL | Age: 75
End: 2025-03-31
Payer: MEDICARE

## 2025-03-31 ENCOUNTER — HOSPITAL ENCOUNTER (OUTPATIENT)
Dept: CT IMAGING | Facility: HOSPITAL | Age: 75
Discharge: HOME | End: 2025-03-31
Payer: MEDICARE

## 2025-03-31 VITALS
TEMPERATURE: 97.4 F | WEIGHT: 249 LBS | HEART RATE: 62 BPM | DIASTOLIC BLOOD PRESSURE: 74 MMHG | HEIGHT: 71 IN | SYSTOLIC BLOOD PRESSURE: 140 MMHG | RESPIRATION RATE: 18 BRPM | OXYGEN SATURATION: 96 % | BODY MASS INDEX: 34.86 KG/M2

## 2025-03-31 DIAGNOSIS — R04.2 COUGH WITH HEMOPTYSIS: Primary | ICD-10-CM

## 2025-03-31 DIAGNOSIS — R04.2 COUGH WITH HEMOPTYSIS: ICD-10-CM

## 2025-03-31 DIAGNOSIS — R05.2 SUBACUTE COUGH: ICD-10-CM

## 2025-03-31 DIAGNOSIS — J69.0 ASPIRATION PNEUMONIA DUE TO REGURGITATED FOOD, UNSPECIFIED LATERALITY, UNSPECIFIED PART OF LUNG: ICD-10-CM

## 2025-03-31 DIAGNOSIS — J18.9 PNEUMONIA OF RIGHT LUNG DUE TO INFECTIOUS ORGANISM, UNSPECIFIED PART OF LUNG: Primary | ICD-10-CM

## 2025-03-31 DIAGNOSIS — J18.9 PNEUMONIA OF RIGHT LUNG DUE TO INFECTIOUS ORGANISM, UNSPECIFIED PART OF LUNG: ICD-10-CM

## 2025-03-31 DIAGNOSIS — I10 PRIMARY HYPERTENSION: ICD-10-CM

## 2025-03-31 LAB
BASOPHILS # BLD AUTO: 0.03 10*3/MM3 (ref 0–0.2)
BASOPHILS NFR BLD AUTO: 0.3 % (ref 0–1.5)
DEPRECATED RDW RBC AUTO: 41.9 FL (ref 37–54)
EOSINOPHIL # BLD AUTO: 0.05 10*3/MM3 (ref 0–0.4)
EOSINOPHIL NFR BLD AUTO: 0.4 % (ref 0.3–6.2)
ERYTHROCYTE [DISTWIDTH] IN BLOOD BY AUTOMATED COUNT: 12.9 % (ref 12.3–15.4)
EXPIRATION DATE: NORMAL
FLUAV AG UPPER RESP QL IA.RAPID: NOT DETECTED
FLUBV AG UPPER RESP QL IA.RAPID: NOT DETECTED
HCT VFR BLD AUTO: 38.1 % (ref 37.5–51)
HGB BLD-MCNC: 12.6 G/DL (ref 13–17.7)
IMM GRANULOCYTES # BLD AUTO: 0.15 10*3/MM3 (ref 0–0.05)
IMM GRANULOCYTES NFR BLD AUTO: 1.3 % (ref 0–0.5)
INTERNAL CONTROL: NORMAL
LYMPHOCYTES # BLD AUTO: 1.59 10*3/MM3 (ref 0.7–3.1)
LYMPHOCYTES NFR BLD AUTO: 13.5 % (ref 19.6–45.3)
Lab: NORMAL
MCH RBC QN AUTO: 28.9 PG (ref 26.6–33)
MCHC RBC AUTO-ENTMCNC: 33.1 G/DL (ref 31.5–35.7)
MCV RBC AUTO: 87.4 FL (ref 79–97)
MONOCYTES # BLD AUTO: 0.63 10*3/MM3 (ref 0.1–0.9)
MONOCYTES NFR BLD AUTO: 5.3 % (ref 5–12)
NEUTROPHILS NFR BLD AUTO: 79.2 % (ref 42.7–76)
NEUTROPHILS NFR BLD AUTO: 9.33 10*3/MM3 (ref 1.7–7)
PLATELET # BLD AUTO: 235 10*3/MM3 (ref 140–450)
PMV BLD AUTO: 11.4 FL (ref 6–12)
RBC # BLD AUTO: 4.36 10*6/MM3 (ref 4.14–5.8)
SARS-COV-2 AG UPPER RESP QL IA.RAPID: NOT DETECTED
WBC NRBC COR # BLD AUTO: 11.78 10*3/MM3 (ref 3.4–10.8)

## 2025-03-31 PROCEDURE — 71046 X-RAY EXAM CHEST 2 VIEWS: CPT

## 2025-03-31 PROCEDURE — 85025 COMPLETE CBC W/AUTO DIFF WBC: CPT

## 2025-03-31 PROCEDURE — 71250 CT THORAX DX C-: CPT

## 2025-03-31 PROCEDURE — 36415 COLL VENOUS BLD VENIPUNCTURE: CPT

## 2025-03-31 RX ORDER — ALBUTEROL SULFATE 0.83 MG/ML
2.5 SOLUTION RESPIRATORY (INHALATION) EVERY 4 HOURS PRN
Qty: 75 ML | Refills: 0 | Status: SHIPPED | OUTPATIENT
Start: 2025-03-31

## 2025-03-31 RX ORDER — AZITHROMYCIN 250 MG/1
TABLET, FILM COATED ORAL
Qty: 6 TABLET | Refills: 0 | Status: SHIPPED | OUTPATIENT
Start: 2025-03-31

## 2025-03-31 NOTE — ASSESSMENT & PLAN NOTE
His blood pressure is slightly elevated today at 148/65 initially, rechecked prior to leaving the office and it was 140/74.  Continue taking losartan 100, atenolol 50 and Norvasc 5.

## 2025-03-31 NOTE — PROGRESS NOTES
Chief Complaint     Cough (Bloody mucus with cough ) and URI (1 week fever one day )    History of Present Illness     Martir Porter is a 74 y.o. male who presents to [facility] with complaints of cough with hemoptysis.       History of Present Illness  The patient is a 74-year-old male who presents for evaluation of hemoptysis.    He has been experiencing a persistent morning cough for the past 3 to 4 years, which he initially dismissed as normal. However, approximately 1 week ago, he began to experience significant wheezing, as reported by his wife. Upon awakening, he noticed blood in his sputum, a symptom that has progressively worsened, now occurring 7 to 8 times daily. The amount of blood is not substantial, and he does not believe it originates from his lungs. He recalls an episode of fever about a week ago, which was severe enough to limit his mobility. He felt better the following day and has been stable since then. He also reports mild nasal congestion but no sinus pressure or headaches. He experienced chills and body aches on one occasion but reports no current symptoms. He also reports no gastrointestinal symptoms such as nausea, vomiting, or diarrhea, and no chest pain or shortness of breath. He suspects that his symptoms may be related to acid reflux, as he recalls a recent episode of late-night eating followed by regurgitation, although he did not vomit. He is currently on the third day of a Z-Matthew regimen. Given his history of bladder cancer and skin cancers, he is concerned about the possibility of a similar condition.    His blood pressure is slightly elevated today at 148/65 initially, rechecked prior to leaving the office and it was 140/74. He is currently taking atenolol 50 mg, amlodipine 5 mg, and losartan 100 mg.    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 CATH INVASIVE LOC[facility];  Service: Cardiology;  Laterality: N/A;    CHOLECYSTECTOMY  2015    COLONOSCOPY      COLONOSCOPY N/A 08/23/2023    Procedure: COLONOSCOPY TO CECUM;  Surgeon: Pollo Choe MD;  Location: [facility] ENDOSCOPY;  Service: Gastroenterology;  Laterality: N/A;  PRE- CONSTIPATION  POST- DIVERTICULOSIS    ENDOSCOPY      HEMORROIDECTOMY      SKIN BIOPSY      SKIN CANCERS    TRANSURETHRAL RESECTION OF BLADDER TUMOR N/A 2/28/2024    Procedure: CYSTOSCOPY TRANSURETHRAL RESECTION OF BLADDER TUMOR;  Surgeon: Senthil Garcia MD;  Location: [facility] MAIN OR;  Service: Urology;  Laterality: N/A;       Family History   Problem Relation Age of Onset    Stroke Mother     Aneurysm Father     Malig Hyperthermia Neg Hx         Current Medications        Current Outpatient Medications:     amLODIPine (NORVASC) 5 MG tablet, Take 1 tablet by mouth Daily., Disp: , Rfl:     atenolol (TENORMIN) 50 MG tablet, Take 1 tablet by mouth Every Evening., Disp: , Rfl:     azithromycin (Zithromax Z-Matthew) 250 MG tablet, Take 2 tablets by mouth on day 1, then 1 tablet daily on days 2-5, Disp: 6 tablet, Rfl: 0    gabapentin (NEURONTIN) 300 MG capsule, Take 1 capsule by mouth Every Night., Disp: 30 capsule, Rfl: 1    lactulose (CHRONULAC) 10 GM/15ML solution, Take 30 mL by mouth 2 (Two) Times a Day  As Needed (constipation)., Disp: 473 mL, Rfl: 1    losartan (COZAAR) 100 MG tablet, Take 1 tablet by mouth Daily., Disp: , Rfl:     nitroglycerin (NITROSTAT) 0.4 MG SL tablet, Place 1 tablet under the tongue Every 5 (Five) Minutes As Needed., Disp: , Rfl:     omeprazole (priLOSEC) 40 MG capsule, 1 capsule Daily., Disp: , Rfl:     ondansetron ODT (ZOFRAN-ODT) 4 MG disintegrating tablet, Place 1 tablet on the tongue Every 8 (Eight) Hours As Needed for Nausea., Disp: 10 tablet, Rfl: 1    predniSONE (DELTASONE) 10 MG tablet, Take 2 tablets x 5 days, then 1 tablets x 5 days then 1/2 tablet x 4 days, Disp: 17 tablet, Rfl: 0    traMADol (ULTRAM) 50 MG tablet, Take 1-2 tablets by mouth Every 6 (Six) Hours As Needed., Disp: , Rfl:     vitamin C (ASCORBIC ACID) 500 MG tablet, Take 1 tablet by mouth Daily., Disp: , Rfl:     vitamin D3 125 MCG (5000 UT) capsule capsule, Take 1 capsule by mouth Daily., Disp: , Rfl:     Wheat Dextrin (Benefiber) powder, Take  by mouth As Needed., Disp: , Rfl:     phenazopyridine (PYRIDIUM) 100 MG tablet, Take 1 tablet by mouth 3 (Three) Times a Day As Needed for Bladder Spasms., Disp: 21 tablet, Rfl: 0     Allergies     No Known Allergies    Social History       Social History     Social History Narrative    Not on file       Immunizations     Immunization:  Immunization History   Administered Date(s) Administered    COVID-19 (MODERNA) 1st,2nd,3rd Dose Monovalent 02/02/2021, 03/12/2021, 01/11/2022    Fluad Quad 65+ 12/01/2023    Flublok 18+yrs 10/20/2021, 11/21/2022    Fluzone (or Fluarix & Flulaval for VFC) >6mos 10/20/2021    Fluzone High-Dose 65+YRS 12/28/2016, 01/27/2018, 10/21/2018    Influenza Injectable Mdck Pf Quad 05/23/2024, 10/15/2024    Pneumococcal Conjugate 13-Valent (PCV13) 12/01/2015    Pneumococcal Conjugate 20-Valent (PCV20) 07/07/2023    Pneumococcal Polysaccharide (PPSV23) 05/08/2017    Shingrix 06/15/2023, 08/31/2023    Td, Not Adsorbed 02/01/2005    Tdap 08/01/2007,  "05/24/2023    flucelvax quad pfs =>4 YRS 11/13/2020          Objective     Objective     Vital Signs:   /74 (BP Location: Left arm, Patient Position: Sitting, Cuff Size: Adult)   Pulse 62   Temp 97.4 °F (36.3 °C) (Oral)   Resp 18   Ht 181.6 cm (71.5\")   Wt 113 kg (249 lb)   SpO2 96% Comment: room air  BMI 34.25 kg/m²       Physical Exam  Vitals and nursing note reviewed.   Constitutional:       Appearance: Normal appearance. He is obese.   HENT:      Head: Normocephalic.      Right Ear: Tympanic membrane, ear canal and external ear normal.      Left Ear: Tympanic membrane, ear canal and external ear normal.      Nose: Nose normal.      Mouth/Throat:      Lips: Pink.      Mouth: Mucous membranes are moist.      Pharynx: Oropharynx is clear. Uvula midline.   Eyes:      Conjunctiva/sclera: Conjunctivae normal.      Pupils: Pupils are equal, round, and reactive to light.   Cardiovascular:      Rate and Rhythm: Normal rate and regular rhythm.      Pulses: Normal pulses.      Heart sounds: Normal heart sounds.   Pulmonary:      Effort: Pulmonary effort is normal.      Comments: Cough present, crackles in the right upper lobe.  Abdominal:      General: Bowel sounds are normal.      Palpations: Abdomen is soft.   Musculoskeletal:         General: Normal range of motion.      Cervical back: Normal range of motion and neck supple.   Lymphadenopathy:      Cervical: No cervical adenopathy.   Skin:     General: Skin is warm and dry.   Neurological:      General: No focal deficit present.      Mental Status: He is alert and oriented to person, place, and time.   Psychiatric:         Attention and Perception: Attention normal.         Mood and Affect: Mood and affect normal.         Behavior: Behavior normal. Behavior is cooperative.         Physical Exam  Ears appear normal. Throat was examined.  Lungs sound normal.  Heart sounds normal.    Vital Signs  Blood pressure is slightly elevated.      Results    The " following data was reviewed by: CHELSEY Williamson on 03/31/25             CBC & Differential (03/31/2025 12:03)  POCT SARS-CoV-2 + Flu Antigen AMY (03/31/2025 11:57)  Respiratory Culture - Sputum, Cough (03/31/2025 11:35)  CT Chest Without Contrast (03/31/2025 12:43)  XR Chest PA & Lateral (03/31/2025 11:59)  Results  Rapid COVID and flu negative.         Assessment and Plan        Assessment and Plan       Cough with hemoptysis    Orders:    XR Chest PA & Lateral; Future    CBC & Differential; Future    Respiratory Culture - Sputum, Cough; Future    POCT SARS-CoV-2 + Flu Antigen AMY  Continue taking Z-Matthew and prednisone.  Will await the results of all the testing to determine if a different antibiotic is needed.  Subacute cough    Orders:    XR Chest PA & Lateral; Future    CBC & Differential; Future    POCT SARS-CoV-2 + Flu Antigen AMY    Primary hypertension    His blood pressure is slightly elevated today at 148/65 initially, rechecked prior to leaving the office and it was 140/74.  Continue taking losartan 100, atenolol 50 and Norvasc 5.            Assessment & Plan  1. Hemoptysis.  He reports coughing up blood for the past week, with increasing frequency up to 7-8 times per day. He is currently on a Z-Matthew (azithromycin) and prednisone and is on the second or third day of treatment. A chest x-ray will be performed today to investigate the cause. Blood work will be conducted to check his blood counts. A sputum culture will be obtained if he can produce a sample during the visit; otherwise, he will be provided with a cup to collect a sample at home and return it for analysis. Tests for COVID-19 and influenza will also be conducted.    2. Elevated blood pressure.  His blood pressure is slightly elevated today. He is currently taking atenolol 50 mg, amlodipine 5 mg, and losartan 100 mg. His blood pressure will be rechecked before he leaves the clinic.        Follow Up        Follow Up   Return for With PCP, Next  scheduled follow up, sooner if condition worsens.  Patient was given instructions and counseling regarding his condition or for health maintenance advice. Please see specific information pulled into the AVS if appropriate.      Patient or patient representative verbalized consent for the use of Ambient Listening during the visit with  CHELSEY Williamson for chart documentation. 3/31/2025  13:57 EDT

## 2025-04-01 ENCOUNTER — LAB (OUTPATIENT)
Dept: LAB | Facility: HOSPITAL | Age: 75
End: 2025-04-01
Payer: MEDICARE

## 2025-04-01 DIAGNOSIS — R04.2 COUGH WITH HEMOPTYSIS: ICD-10-CM

## 2025-04-01 PROCEDURE — 87205 SMEAR GRAM STAIN: CPT

## 2025-04-01 PROCEDURE — 87070 CULTURE OTHR SPECIMN AEROBIC: CPT

## 2025-04-03 LAB
BACTERIA SPEC RESP CULT: NORMAL
GRAM STN SPEC: NORMAL

## 2025-04-12 PROCEDURE — 93010 ELECTROCARDIOGRAM REPORT: CPT | Performed by: INTERNAL MEDICINE

## 2025-04-12 PROCEDURE — 99285 EMERGENCY DEPT VISIT HI MDM: CPT

## 2025-04-12 PROCEDURE — 93005 ELECTROCARDIOGRAM TRACING: CPT

## 2025-04-12 PROCEDURE — 93005 ELECTROCARDIOGRAM TRACING: CPT | Performed by: EMERGENCY MEDICINE

## 2025-04-12 RX ORDER — ASPIRIN 81 MG/1
324 TABLET, CHEWABLE ORAL ONCE
Status: COMPLETED | OUTPATIENT
Start: 2025-04-13 | End: 2025-04-13

## 2025-04-12 RX ORDER — SODIUM CHLORIDE 0.9 % (FLUSH) 0.9 %
10 SYRINGE (ML) INJECTION AS NEEDED
Status: DISCONTINUED | OUTPATIENT
Start: 2025-04-12 | End: 2025-04-13 | Stop reason: HOSPADM

## 2025-04-13 ENCOUNTER — HOSPITAL ENCOUNTER (EMERGENCY)
Facility: HOSPITAL | Age: 75
Discharge: HOME | End: 2025-04-13
Attending: EMERGENCY MEDICINE | Admitting: EMERGENCY MEDICINE
Payer: MEDICARE

## 2025-04-13 ENCOUNTER — APPOINTMENT (OUTPATIENT)
Dept: CT IMAGING | Facility: HOSPITAL | Age: 75
End: 2025-04-13
Payer: MEDICARE

## 2025-04-13 ENCOUNTER — APPOINTMENT (OUTPATIENT)
Dept: GENERAL RADIOLOGY | Facility: HOSPITAL | Age: 75
End: 2025-04-13
Payer: MEDICARE

## 2025-04-13 VITALS
TEMPERATURE: 97.1 F | WEIGHT: 250 LBS | OXYGEN SATURATION: 96 % | HEART RATE: 70 BPM | DIASTOLIC BLOOD PRESSURE: 72 MMHG | SYSTOLIC BLOOD PRESSURE: 134 MMHG | RESPIRATION RATE: 18 BRPM | BODY MASS INDEX: 33.86 KG/M2 | HEIGHT: 72 IN

## 2025-04-13 DIAGNOSIS — J18.9 PNEUMONIA OF RIGHT LUNG DUE TO INFECTIOUS ORGANISM, UNSPECIFIED PART OF LUNG: ICD-10-CM

## 2025-04-13 DIAGNOSIS — R07.9 CHEST PAIN, UNSPECIFIED TYPE: Primary | ICD-10-CM

## 2025-04-13 LAB
ALBUMIN SERPL-MCNC: 4 G/DL (ref 3.5–5.2)
ALBUMIN/GLOB SERPL: 1.4 G/DL
ALP SERPL-CCNC: 87 U/L (ref 39–117)
ALT SERPL W P-5'-P-CCNC: 13 U/L (ref 1–41)
AMYLASE SERPL-CCNC: 43 U/L (ref 28–100)
ANION GAP SERPL CALCULATED.3IONS-SCNC: 9.6 MMOL/L (ref 5–15)
APTT PPP: 33.8 SECONDS (ref 24.5–35.9)
AST SERPL-CCNC: 14 U/L (ref 1–40)
BASOPHILS # BLD AUTO: 0.07 10*3/MM3 (ref 0–0.2)
BASOPHILS NFR BLD AUTO: 0.8 % (ref 0–1.5)
BILIRUB SERPL-MCNC: 0.3 MG/DL (ref 0–1.2)
BUN SERPL-MCNC: 16 MG/DL (ref 8–23)
BUN/CREAT SERPL: 12.2 (ref 7–25)
CALCIUM SPEC-SCNC: 8.8 MG/DL (ref 8.6–10.5)
CHLORIDE SERPL-SCNC: 107 MMOL/L (ref 98–107)
CO2 SERPL-SCNC: 26.4 MMOL/L (ref 22–29)
CREAT SERPL-MCNC: 1.31 MG/DL (ref 0.76–1.27)
D DIMER PPP FEU-MCNC: 0.3 MCGFEU/ML (ref 0–0.76)
DEPRECATED RDW RBC AUTO: 43.3 FL (ref 37–54)
EGFRCR SERPLBLD CKD-EPI 2021: 56.4 ML/MIN/1.73
EOSINOPHIL # BLD AUTO: 0.25 10*3/MM3 (ref 0–0.4)
EOSINOPHIL NFR BLD AUTO: 2.9 % (ref 0.3–6.2)
ERYTHROCYTE [DISTWIDTH] IN BLOOD BY AUTOMATED COUNT: 13.2 % (ref 12.3–15.4)
GEN 5 1HR TROPONIN T REFLEX: 9 NG/L
GLOBULIN UR ELPH-MCNC: 2.8 GM/DL
GLUCOSE SERPL-MCNC: 125 MG/DL (ref 65–99)
HCT VFR BLD AUTO: 38 % (ref 37.5–51)
HGB BLD-MCNC: 12.2 G/DL (ref 13–17.7)
HOLD SPECIMEN: NORMAL
HOLD SPECIMEN: NORMAL
IMM GRANULOCYTES # BLD AUTO: 0.03 10*3/MM3 (ref 0–0.05)
IMM GRANULOCYTES NFR BLD AUTO: 0.4 % (ref 0–0.5)
INR PPP: 0.9 (ref 0.9–1.1)
LIPASE SERPL-CCNC: 14 U/L (ref 13–60)
LYMPHOCYTES # BLD AUTO: 2.04 10*3/MM3 (ref 0.7–3.1)
LYMPHOCYTES NFR BLD AUTO: 23.8 % (ref 19.6–45.3)
MAGNESIUM SERPL-MCNC: 2.3 MG/DL (ref 1.6–2.4)
MCH RBC QN AUTO: 28.6 PG (ref 26.6–33)
MCHC RBC AUTO-ENTMCNC: 32.1 G/DL (ref 31.5–35.7)
MCV RBC AUTO: 89.2 FL (ref 79–97)
MONOCYTES # BLD AUTO: 0.97 10*3/MM3 (ref 0.1–0.9)
MONOCYTES NFR BLD AUTO: 11.3 % (ref 5–12)
NEUTROPHILS NFR BLD AUTO: 5.2 10*3/MM3 (ref 1.7–7)
NEUTROPHILS NFR BLD AUTO: 60.8 % (ref 42.7–76)
NRBC BLD AUTO-RTO: 0 /100 WBC (ref 0–0.2)
NT-PROBNP SERPL-MCNC: 322.2 PG/ML (ref 0–1800)
PLATELET # BLD AUTO: 211 10*3/MM3 (ref 140–450)
PMV BLD AUTO: 10.9 FL (ref 6–12)
POTASSIUM SERPL-SCNC: 4.1 MMOL/L (ref 3.5–5.2)
PROT SERPL-MCNC: 6.8 G/DL (ref 6–8.5)
PROTHROMBIN TIME: 12.2 SECONDS (ref 11.6–15.1)
QT INTERVAL: 416 MS
QTC INTERVAL: 439 MS
RBC # BLD AUTO: 4.26 10*6/MM3 (ref 4.14–5.8)
SODIUM SERPL-SCNC: 143 MMOL/L (ref 136–145)
TROPONIN T NUMERIC DELTA: 0 NG/L
TROPONIN T SERPL HS-MCNC: 9 NG/L
WBC NRBC COR # BLD AUTO: 8.56 10*3/MM3 (ref 3.4–10.8)
WHOLE BLOOD HOLD COAG: NORMAL
WHOLE BLOOD HOLD SPECIMEN: NORMAL

## 2025-04-13 PROCEDURE — 83735 ASSAY OF MAGNESIUM: CPT | Performed by: PHYSICIAN ASSISTANT

## 2025-04-13 PROCEDURE — 71045 X-RAY EXAM CHEST 1 VIEW: CPT | Performed by: RADIOLOGY

## 2025-04-13 PROCEDURE — 80053 COMPREHEN METABOLIC PANEL: CPT | Performed by: EMERGENCY MEDICINE

## 2025-04-13 PROCEDURE — 84484 ASSAY OF TROPONIN QUANT: CPT | Performed by: EMERGENCY MEDICINE

## 2025-04-13 PROCEDURE — 85025 COMPLETE CBC W/AUTO DIFF WBC: CPT | Performed by: EMERGENCY MEDICINE

## 2025-04-13 PROCEDURE — 83690 ASSAY OF LIPASE: CPT | Performed by: PHYSICIAN ASSISTANT

## 2025-04-13 PROCEDURE — 36415 COLL VENOUS BLD VENIPUNCTURE: CPT

## 2025-04-13 PROCEDURE — 85730 THROMBOPLASTIN TIME PARTIAL: CPT | Performed by: PHYSICIAN ASSISTANT

## 2025-04-13 PROCEDURE — 85379 FIBRIN DEGRADATION QUANT: CPT | Performed by: PHYSICIAN ASSISTANT

## 2025-04-13 PROCEDURE — 25510000001 IOPAMIDOL PER 1 ML: Performed by: EMERGENCY MEDICINE

## 2025-04-13 PROCEDURE — 85610 PROTHROMBIN TIME: CPT | Performed by: PHYSICIAN ASSISTANT

## 2025-04-13 PROCEDURE — 74175 CTA ABDOMEN W/CONTRAST: CPT

## 2025-04-13 PROCEDURE — 74175 CTA ABDOMEN W/CONTRAST: CPT | Performed by: RADIOLOGY

## 2025-04-13 PROCEDURE — 82150 ASSAY OF AMYLASE: CPT | Performed by: PHYSICIAN ASSISTANT

## 2025-04-13 PROCEDURE — 71045 X-RAY EXAM CHEST 1 VIEW: CPT

## 2025-04-13 PROCEDURE — 83880 ASSAY OF NATRIURETIC PEPTIDE: CPT | Performed by: PHYSICIAN ASSISTANT

## 2025-04-13 RX ORDER — DOXYCYCLINE 100 MG/1
100 CAPSULE ORAL 2 TIMES DAILY
Qty: 20 CAPSULE | Refills: 0 | Status: SHIPPED | OUTPATIENT
Start: 2025-04-13 | End: 2025-04-23

## 2025-04-13 RX ORDER — CEFDINIR 300 MG/1
300 CAPSULE ORAL ONCE
Status: COMPLETED | OUTPATIENT
Start: 2025-04-13 | End: 2025-04-13

## 2025-04-13 RX ORDER — IOPAMIDOL 755 MG/ML
100 INJECTION, SOLUTION INTRAVASCULAR
Status: COMPLETED | OUTPATIENT
Start: 2025-04-13 | End: 2025-04-13

## 2025-04-13 RX ORDER — DOXYCYCLINE 100 MG/1
100 CAPSULE ORAL ONCE
Status: COMPLETED | OUTPATIENT
Start: 2025-04-13 | End: 2025-04-13

## 2025-04-13 RX ORDER — CEFDINIR 300 MG/1
300 CAPSULE ORAL 2 TIMES DAILY
Qty: 20 CAPSULE | Refills: 0 | Status: SHIPPED | OUTPATIENT
Start: 2025-04-13 | End: 2025-04-23

## 2025-04-13 RX ADMIN — IOPAMIDOL 80 ML: 755 INJECTION, SOLUTION INTRAVENOUS at 03:02

## 2025-04-13 RX ADMIN — CEFDINIR 300 MG: 300 CAPSULE ORAL at 04:56

## 2025-04-13 RX ADMIN — ASPIRIN 162 MG: 81 TABLET, CHEWABLE ORAL at 00:21

## 2025-04-13 RX ADMIN — DOXYCYCLINE 100 MG: 100 CAPSULE ORAL at 04:56

## 2025-04-13 NOTE — ED PROVIDER NOTES
"Subjective   History of Present Illness  74-year-old male who presents to the ED today for chest pain.  He states he woke up with this pain around 8:30 AM.  He states the pain is in the left side of his chest and goes into his back and down his left arm to his elbow.  He describes it as a throbbing pain.  He tells me that \"I feel strange.\"  He states the pain has been fairly constant throughout the day but does seem to be getting worse.  He states belching helps relieve his symptoms a little bit.  He denies any shortness of breath.  He denies any nausea or vomiting.  He denies any diaphoresis.  He states he did take 2 baby aspirin prior to arrival.  He also reports that he had pneumonia last week.  He states he does have a history of hypertension.  He denies any history of coronary artery disease.    History provided by:  Patient  Chest Pain  Pain location:  L chest  Pain quality: throbbing    Pain radiates to:  Upper back and L arm  Pain severity:  Moderate  Onset quality:  Gradual  Duration:  16 hours  Timing:  Constant  Progression:  Worsening  Chronicity:  New  Relieved by:  Nothing  Worsened by:  Nothing  Associated symptoms: back pain    Associated symptoms: no abdominal pain, no altered mental status, no anorexia, no anxiety, no cough, no diaphoresis, no dizziness, no dysphagia, no fatigue, no fever, no headache, no heartburn, no lower extremity edema, no nausea, no near-syncope, no palpitations, no shortness of breath, no syncope, no vomiting and no weakness    Risk factors: hypertension and obesity        Review of Systems   Constitutional: Negative.  Negative for diaphoresis, fatigue and fever.   HENT: Negative.  Negative for trouble swallowing.    Eyes: Negative.    Respiratory: Negative.  Negative for cough and shortness of breath.    Cardiovascular:  Positive for chest pain. Negative for palpitations, syncope and near-syncope.   Gastrointestinal:  Negative for abdominal pain, anorexia, heartburn, nausea " and vomiting.   Genitourinary: Negative.    Musculoskeletal:  Positive for back pain.   Skin: Negative.    Neurological:  Negative for dizziness, weakness and headaches.   Psychiatric/Behavioral: Negative.     All other systems reviewed and are negative.      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    GERD (gastroesophageal reflux disease)     Hemoptysis 2021    History of kidney stones     History of skin cancer     BASAL CELL REMOVED FROM NOSE, TOP OF HEAD, LT CHEEK    Hypertension     Personal history of COVID-19 2021    Pneumonia 2021    Pneumonia due to COVID-19 virus 2021       No Known Allergies    Past Surgical History:   Procedure Laterality Date    CARDIAC CATHETERIZATION N/A 2022    Procedure: Left Heart Cath;  Surgeon: MONY Francisco MD;  Location: [facility] CATH INVASIVE LOCATION;  Service: Cardiology;  Laterality: N/A;    CHOLECYSTECTOMY      COLONOSCOPY      COLONOSCOPY N/A 2023    Procedure: COLONOSCOPY TO CECUM;  Surgeon: Pollo Choe MD;  Location: [facility] ENDOSCOPY;  Service: Gastroenterology;  Laterality: N/A;  PRE- CONSTIPATION  POST- DIVERTICULOSIS    ENDOSCOPY      HEMORROIDECTOMY      SKIN BIOPSY      SKIN CANCERS    TRANSURETHRAL RESECTION OF BLADDER TUMOR N/A 2024    Procedure: CYSTOSCOPY TRANSURETHRAL RESECTION OF BLADDER TUMOR;  Surgeon: Senthil Garcia MD;  Location: [facility] OR;  Service: Urology;  Laterality: N/A;       Family History   Problem Relation Age of Onset    Stroke Mother     Aneurysm Father     Malig Hyperthermia Neg Hx        Social History     Socioeconomic History    Marital status:    Tobacco Use    Smoking status: Former     Current packs/day: 0.00     Types: Cigarettes     Quit date:      Years since quittin.3      Passive exposure: Never    Smokeless tobacco: Never   Vaping Use    Vaping status: Never Used   Substance and Sexual Activity    Alcohol use: Never    Drug use: Never    Sexual activity: Defer           Objective   Physical Exam  Vitals and nursing note reviewed.   Constitutional:       General: He is not in acute distress.     Appearance: He is well-developed. He is obese. He is not diaphoretic.   HENT:      Head: Normocephalic and atraumatic.   Eyes:      Extraocular Movements: Extraocular movements intact.      Pupils: Pupils are equal, round, and reactive to light.   Neck:      Vascular: No JVD.      Trachea: No tracheal deviation.   Cardiovascular:      Rate and Rhythm: Normal rate and regular rhythm.      Heart sounds: Normal heart sounds.   Pulmonary:      Effort: Pulmonary effort is normal.      Breath sounds: Normal breath sounds.   Chest:      Chest wall: No tenderness.   Abdominal:      General: Bowel sounds are normal.      Palpations: Abdomen is soft.      Tenderness: There is no abdominal tenderness.   Musculoskeletal:         General: Normal range of motion.      Cervical back: Normal range of motion and neck supple.   Lymphadenopathy:      Cervical: No cervical adenopathy.   Skin:     General: Skin is warm and dry.      Capillary Refill: Capillary refill takes less than 2 seconds.   Neurological:      General: No focal deficit present.      Mental Status: He is alert and oriented to person, place, and time.   Psychiatric:         Mood and Affect: Mood normal.         Procedures       Results for orders placed or performed during the hospital encounter of 04/13/25   ECG 12 Lead ED Triage Standing Order; Chest Pain    Collection Time: 04/12/25 11:58 PM   Result Value Ref Range    QT Interval 416 ms    QTC Interval 439 ms   Comprehensive Metabolic Panel    Collection Time: 04/13/25 12:35 AM    Specimen: Blood   Result Value Ref Range    Glucose 125 (H) 65 - 99 mg/dL    BUN 16 8 - 23 mg/dL    Creatinine  1.31 (H) 0.76 - 1.27 mg/dL    Sodium 143 136 - 145 mmol/L    Potassium 4.1 3.5 - 5.2 mmol/L    Chloride 107 98 - 107 mmol/L    CO2 26.4 22.0 - 29.0 mmol/L    Calcium 8.8 8.6 - 10.5 mg/dL    Total Protein 6.8 6.0 - 8.5 g/dL    Albumin 4.0 3.5 - 5.2 g/dL    ALT (SGPT) 13 1 - 41 U/L    AST (SGOT) 14 1 - 40 U/L    Alkaline Phosphatase 87 39 - 117 U/L    Total Bilirubin 0.3 0.0 - 1.2 mg/dL    Globulin 2.8 gm/dL    A/G Ratio 1.4 g/dL    BUN/Creatinine Ratio 12.2 7.0 - 25.0    Anion Gap 9.6 5.0 - 15.0 mmol/L    eGFR 56.4 (L) >60.0 mL/min/1.73   High Sensitivity Troponin T    Collection Time: 04/13/25 12:35 AM    Specimen: Blood   Result Value Ref Range    HS Troponin T 9 <22 ng/L   CBC Auto Differential    Collection Time: 04/13/25 12:35 AM    Specimen: Blood   Result Value Ref Range    WBC 8.56 3.40 - 10.80 10*3/mm3    RBC 4.26 4.14 - 5.80 10*6/mm3    Hemoglobin 12.2 (L) 13.0 - 17.7 g/dL    Hematocrit 38.0 37.5 - 51.0 %    MCV 89.2 79.0 - 97.0 fL    MCH 28.6 26.6 - 33.0 pg    MCHC 32.1 31.5 - 35.7 g/dL    RDW 13.2 12.3 - 15.4 %    RDW-SD 43.3 37.0 - 54.0 fl    MPV 10.9 6.0 - 12.0 fL    Platelets 211 140 - 450 10*3/mm3    Neutrophil % 60.8 42.7 - 76.0 %    Lymphocyte % 23.8 19.6 - 45.3 %    Monocyte % 11.3 5.0 - 12.0 %    Eosinophil % 2.9 0.3 - 6.2 %    Basophil % 0.8 0.0 - 1.5 %    Immature Grans % 0.4 0.0 - 0.5 %    Neutrophils, Absolute 5.20 1.70 - 7.00 10*3/mm3    Lymphocytes, Absolute 2.04 0.70 - 3.10 10*3/mm3    Monocytes, Absolute 0.97 (H) 0.10 - 0.90 10*3/mm3    Eosinophils, Absolute 0.25 0.00 - 0.40 10*3/mm3    Basophils, Absolute 0.07 0.00 - 0.20 10*3/mm3    Immature Grans, Absolute 0.03 0.00 - 0.05 10*3/mm3    nRBC 0.0 0.0 - 0.2 /100 WBC   Green Top (Gel)    Collection Time: 04/13/25 12:35 AM   Result Value Ref Range    Extra Tube Hold for add-ons.    Lavender Top    Collection Time: 04/13/25 12:35 AM   Result Value Ref Range    Extra Tube hold for add-on    Gold Top - SST    Collection Time: 04/13/25 12:35 AM    Result Value Ref Range    Extra Tube Hold for add-ons.    Light Blue Top    Collection Time: 04/13/25 12:35 AM   Result Value Ref Range    Extra Tube Hold for add-ons.    Protime-INR    Collection Time: 04/13/25 12:44 AM    Specimen: Blood   Result Value Ref Range    Protime 12.2 11.6 - 15.1 Seconds    INR 0.90 0.90 - 1.10   aPTT    Collection Time: 04/13/25 12:44 AM    Specimen: Blood   Result Value Ref Range    PTT 33.8 24.5 - 35.9 seconds   Lipase    Collection Time: 04/13/25 12:44 AM    Specimen: Blood   Result Value Ref Range    Lipase 14 13 - 60 U/L   Amylase    Collection Time: 04/13/25 12:44 AM    Specimen: Blood   Result Value Ref Range    Amylase 43 28 - 100 U/L   BNP    Collection Time: 04/13/25 12:44 AM    Specimen: Blood   Result Value Ref Range    proBNP 322.2 0.0 - 1,800.0 pg/mL   Magnesium    Collection Time: 04/13/25 12:44 AM    Specimen: Blood   Result Value Ref Range    Magnesium 2.3 1.6 - 2.4 mg/dL   D-dimer, Quantitative    Collection Time: 04/13/25 12:44 AM    Specimen: Blood   Result Value Ref Range    D-Dimer, Quantitative 0.30 0.00 - 0.76 MCGFEU/mL   High Sensitivity Troponin T 1Hr    Collection Time: 04/13/25  1:56 AM    Specimen: Arm, Right; Blood   Result Value Ref Range    HS Troponin T 9 <22 ng/L    Troponin T Numeric Delta 0 Abnormal if >/=3 ng/L          ED Course  ED Course as of 04/13/25 0450   Sun Apr 13, 2025   0010 EKG is performed at 2358.  The machine reads this as accelerated junctional rhythm, I favor sinus rhythm.  Rate 67.  QRS duration 94, QTc 439 ms.  Possible septal infarct, age indeterminant.  Nonspecific ST-T changes.  No STEMI criteria [CM]   0054 XR Chest 1 View  Normal heart size  Coarsened bronchovascular pattern to the lungs  No edema or pneumonia. No pleural effusion or pneumothorax  No fracture or foreign body.     IMPRESSION:     Coarsened bronchovascular pattern to the lungs  Mild atelectasis at the lung bases.  No conclusive features of pneumonia.  No  edema.  No pleural effusion.  No pneumothorax.   [AH]   0359 Awaiting reading on CT scan [AH]   0418 Patient is currently sleeping, no acute distress [AH]   0438 CT Angiogram Aorta  CTA CHEST:  1.  NEGATIVE FOR DISSECTION OR ANEURYSM.  2.  MULTIFOCAL PROBABLE PNEUMONIA IN THE PERIHILAR RIGHT LUNG.   FOLLOW-UP TO RESOLUTION IS RECOMMENDED TO EXCLUDE AN UNDERLYING  NEOPLASTIC PROCESS.     CTA ABDOMEN AND PELVIS:  NO ACUTE ABNORMALITY IN THE ABDOMEN OR PELVIS.   [AH]      ED Course User Index  [AH] Antonella Bailey, PA  [CM] Derek Salmeron MD                  HEART Score: 4                                      Medical Decision Making  74-year-old male presents to the ED today for chest pain.  It started when he woke up in the morning and has been constant throughout the day.  EKG shows no acute ischemia.  Serial troponins were negative.  D-dimer was negative.  CT angio of the aorta shows no evidence of aneurysm or dissection.  He does have a multifocal right sided pneumonia on CT scan.  He states he was treated for this about 1 or 2 weeks ago with azithromycin.  No evidence of sepsis.  The patient is not hypoxic.  He is afebrile.  I discussed the patient with Dr. Salmeron, ED attending.  He recommends retreating with cefdinir and doxycycline.  I offered to schedule the patient for an outpatient stress test however he states he lives close to [place] and would like to do this near home as he plans to go home tomorrow.  I recommended that he follow-up with his family doctor on Monday so that this can be scheduled for him.  He was advised to return to the closest ED at any time if symptoms change or worsen.    Problems Addressed:  Chest pain, unspecified type: complicated acute illness or injury  Pneumonia of right lung due to infectious organism, unspecified part of lung: complicated acute illness or injury    Amount and/or Complexity of Data Reviewed  Labs: ordered.  Radiology: ordered. Decision-making details  documented in ED Course.  ECG/medicine tests: ordered.    Risk  OTC drugs.  Prescription drug management.        Final diagnoses:   Chest pain, unspecified type   Pneumonia of right lung due to infectious organism, unspecified part of lung       ED Disposition  ED Disposition       ED Disposition   Discharge    Condition   Stable    Comment   --               Sarwat Iniguez MD  [address] [facility] [address] [phone]    Schedule an appointment as soon as possible for a visit in 2 days           Medication List        New Prescriptions      cefdinir 300 MG capsule  Commonly known as: OMNICEF  Take 1 capsule by mouth 2 (Two) Times a Day for 10 days.     doxycycline 100 MG capsule  Commonly known as: MONODOX  Take 1 capsule by mouth 2 (Two) Times a Day for 10 days.               Where to Get Your Medications        These medications were sent to [facility] [address] [place] [address] - [address] [place] [address] - [phone] [facility] [phone] [address] [address]      Phone: [phone]   cefdinir 300 MG capsule  doxycycline 100 MG capsule            Antonella Bailey PA  04/13/25 0458

## 2025-04-13 NOTE — DISCHARGE INSTRUCTIONS
Follow-up with your family doctor in the office on Monday.  You will need to be scheduled for an outpatient stress test.  Prescriptions for 2 antibiotics has been sent to the pharmacy.  You can start these tomorrow.  Return to the ED at any time if symptoms change or worsen.

## 2025-04-16 ENCOUNTER — CLINICAL SUPPORT (OUTPATIENT)
Dept: FAMILY MEDICINE CLINIC | Age: 75
End: 2025-04-16
Payer: MEDICARE

## 2025-04-16 DIAGNOSIS — J18.9 PNEUMONIA OF RIGHT LUNG DUE TO INFECTIOUS ORGANISM, UNSPECIFIED PART OF LUNG: Primary | ICD-10-CM

## 2025-04-16 PROCEDURE — 96372 THER/PROPH/DIAG INJ SC/IM: CPT | Performed by: NURSE PRACTITIONER

## 2025-04-23 ENCOUNTER — TRANSCRIBE ORDERS (OUTPATIENT)
Dept: ADMINISTRATIVE | Facility: HOSPITAL | Age: 75
End: 2025-04-23
Payer: MEDICARE

## 2025-04-23 DIAGNOSIS — J18.9 MULTIFOCAL PNEUMONIA: Primary | ICD-10-CM

## 2025-06-04 ENCOUNTER — DOCUMENTATION (OUTPATIENT)
Dept: FAMILY MEDICINE CLINIC | Age: 75
End: 2025-06-04
Payer: MEDICARE

## 2025-06-04 DIAGNOSIS — J18.9 PNEUMONIA OF RIGHT LUNG DUE TO INFECTIOUS ORGANISM, UNSPECIFIED PART OF LUNG: ICD-10-CM

## 2025-06-04 DIAGNOSIS — R04.2 COUGH WITH HEMOPTYSIS: Primary | ICD-10-CM

## 2025-06-05 ENCOUNTER — HOSPITAL ENCOUNTER (OUTPATIENT)
Dept: CT IMAGING | Facility: HOSPITAL | Age: 75
Discharge: HOME | End: 2025-06-05
Admitting: NURSE PRACTITIONER
Payer: MEDICARE

## 2025-06-05 DIAGNOSIS — R04.2 COUGH WITH HEMOPTYSIS: ICD-10-CM

## 2025-06-05 DIAGNOSIS — J18.9 PNEUMONIA OF RIGHT LUNG DUE TO INFECTIOUS ORGANISM, UNSPECIFIED PART OF LUNG: ICD-10-CM

## 2025-06-05 LAB
CREAT BLDA-MCNC: 1.3 MG/DL (ref 0.6–1.3)
EGFRCR SERPLBLD CKD-EPI 2021: 56.9 ML/MIN/1.73

## 2025-06-05 PROCEDURE — 71260 CT THORAX DX C+: CPT

## 2025-06-05 PROCEDURE — 25510000001 IOPAMIDOL PER 1 ML: Performed by: NURSE PRACTITIONER

## 2025-06-05 PROCEDURE — 82565 ASSAY OF CREATININE: CPT

## 2025-06-05 RX ORDER — IOPAMIDOL 755 MG/ML
100 INJECTION, SOLUTION INTRAVASCULAR
Status: COMPLETED | OUTPATIENT
Start: 2025-06-05 | End: 2025-06-05

## 2025-06-05 RX ADMIN — IOPAMIDOL 100 ML: 755 INJECTION, SOLUTION INTRAVENOUS at 12:15

## 2025-06-06 RX ORDER — IOPAMIDOL 755 MG/ML
100 INJECTION, SOLUTION INTRAVASCULAR
Status: SHIPPED | OUTPATIENT
Start: 2025-06-06

## 2025-06-17 DIAGNOSIS — G57.91 NEUROPATHY OF RIGHT FOOT: Primary | ICD-10-CM

## 2025-06-17 RX ORDER — GABAPENTIN 300 MG/1
300 CAPSULE ORAL NIGHTLY
Qty: 30 CAPSULE | Refills: 0 | Status: SHIPPED | OUTPATIENT
Start: 2025-06-17

## 2025-08-09 ENCOUNTER — DOCUMENTATION (OUTPATIENT)
Dept: FAMILY MEDICINE CLINIC | Age: 75
End: 2025-08-09
Payer: MEDICARE

## 2025-08-09 DIAGNOSIS — N41.9 PROSTATE INFECTION: Primary | ICD-10-CM

## 2025-08-09 RX ORDER — LEVOFLOXACIN 500 MG/1
500 TABLET, FILM COATED ORAL DAILY
Qty: 5 TABLET | Refills: 0 | Status: SHIPPED | OUTPATIENT
Start: 2025-08-09

## (undated) DEVICE — Device

## (undated) DEVICE — CVR PROB ULTRASND GLS STRL

## (undated) DEVICE — SHT AIR TRANSFR COMFRT GLIDE LAT 40X80IN

## (undated) DEVICE — DRSNG SURESITE WNDW 4X4.5

## (undated) DEVICE — BLD CLIP UNIV SURG GRY

## (undated) DEVICE — SENSR O2 OXIMAX FNGR A/ 18IN NONSTR

## (undated) DEVICE — KT ORCA ORCAPOD DISP STRL

## (undated) DEVICE — MODEL BT2000 P/N 700287-012KIT CONTENTS: MANIFOLD WITH SALINE AND CONTRAST PORTS, SALINE TUBING WITH SPIKE AND HAND SYRINGE, TRANSDUCER: Brand: BT2000 AUTOMATED MANIFOLD KIT

## (undated) DEVICE — GW FC FLOP/TP .035 260CM 3MM

## (undated) DEVICE — BG DRN UROL FOR SIEMENS UROSKOP SYS

## (undated) DEVICE — CANNULA,OXY,ADULT,SUPER SOFT,W/14'TUB,UC: Brand: MEDLINE INDUSTRIES, INC.

## (undated) DEVICE — GLV SURG SIGNATURE ESSENTIAL PF LTX SZ8

## (undated) DEVICE — MODEL AT P65, P/N 701554-001KIT CONTENTS: HAND CONTROLLER, 3-WAY HIGH-PRESSURE STOPCOCK WITH ROTATING END AND PREMIUM HIGH-PRESSURE TUBING: Brand: ANGIOTOUCH® KIT

## (undated) DEVICE — GLV SURG SENSICARE SLT PF LF 8 STRL

## (undated) DEVICE — BRACHIAL/AXILLARY/CEREBRAL DRAPE 38 1/2" X 60": Brand: BRACHIAL/AXILLARY/CEREBRAL DRAPE

## (undated) DEVICE — RADIFOCUS GLIDEWIRE: Brand: GLIDEWIRE

## (undated) DEVICE — APPL CHLORAPREP HI/LITE TINTED 10.5ML ORNG

## (undated) DEVICE — TR BAND RADIAL ARTERY COMPRESSION DEVICE: Brand: TR BAND

## (undated) DEVICE — GLV SURG SENSICARE SLT PF LF 7.5 STRL

## (undated) DEVICE — PAD GRND E/S MEGADYNE MONOPLR 2/PLT W/CORD A/ DISP

## (undated) DEVICE — TUBING, SUCTION, 1/4" X 10', STRAIGHT: Brand: MEDLINE

## (undated) DEVICE — ADAPT CLN BIOGUARD AIR/H2O DISP

## (undated) DEVICE — A2000 MULTI-USE SYRINGE KIT, P/N 701277-003KIT CONTENTS: 100ML CONTRAST RESERVOIR AND TUBING WITH CONTRAST SPIKE AND CLAMP: Brand: A2000 MULTI-USE SYRINGE KIT

## (undated) DEVICE — RADIFOCUS OPTITORQUE ANGIOGRAPHIC CATHETER: Brand: OPTITORQUE

## (undated) DEVICE — GLV SURG SENSICARE SLT PF LF 7 STRL

## (undated) DEVICE — CATH F5INF TLPIGST145 110CM6SH: Brand: INFINITI

## (undated) DEVICE — CANN O2 ETCO2 FITS ALL CONN CO2 SMPL A/ 7IN DISP LF

## (undated) DEVICE — SENSR O2 OXIMAX FNGR ADHS A/ 1P/U

## (undated) DEVICE — MIT PREP PRE/OP 5.5X7IN

## (undated) DEVICE — LN SMPL CO2 SHTRM SD STREAM W/M LUER

## (undated) DEVICE — CONTRST ISOVUE370 76PCT 100ML

## (undated) DEVICE — DECANTER: Brand: UNBRANDED

## (undated) DEVICE — CATH LAB PACK: Brand: MEDLINE INDUSTRIES, INC.

## (undated) DEVICE — GLIDESHEATH SLENDER ACCESS KIT: Brand: GLIDESHEATH SLENDER